# Patient Record
Sex: FEMALE | Race: WHITE | Employment: FULL TIME | URBAN - NONMETROPOLITAN AREA
[De-identification: names, ages, dates, MRNs, and addresses within clinical notes are randomized per-mention and may not be internally consistent; named-entity substitution may affect disease eponyms.]

---

## 2022-08-28 ENCOUNTER — HOSPITAL ENCOUNTER (EMERGENCY)
Age: 57
Discharge: ANOTHER HOSPITAL | End: 2022-08-29
Attending: EMERGENCY MEDICINE
Payer: COMMERCIAL

## 2022-08-28 DIAGNOSIS — F19.90 DRUG USE: Primary | ICD-10-CM

## 2022-08-28 DIAGNOSIS — R44.3 HALLUCINATIONS: ICD-10-CM

## 2022-08-28 LAB
ABSOLUTE EOS #: 0.07 K/UL (ref 0–0.44)
ABSOLUTE IMMATURE GRANULOCYTE: 0.08 K/UL (ref 0–0.3)
ABSOLUTE LYMPH #: 3.26 K/UL (ref 1.1–3.7)
ABSOLUTE MONO #: 0.8 K/UL (ref 0.1–1.2)
BASOPHILS # BLD: 1 % (ref 0–2)
BASOPHILS ABSOLUTE: 0.08 K/UL (ref 0–0.2)
EOSINOPHILS RELATIVE PERCENT: 1 % (ref 1–4)
HCT VFR BLD CALC: 44.7 % (ref 36.3–47.1)
HEMOGLOBIN: 14.7 G/DL (ref 11.9–15.1)
IMMATURE GRANULOCYTES: 1 %
LYMPHOCYTES # BLD: 24 % (ref 24–43)
MCH RBC QN AUTO: 28.1 PG (ref 25.2–33.5)
MCHC RBC AUTO-ENTMCNC: 32.9 G/DL (ref 28.4–34.8)
MCV RBC AUTO: 85.3 FL (ref 82.6–102.9)
MONOCYTES # BLD: 6 % (ref 3–12)
NRBC AUTOMATED: 0 PER 100 WBC
PDW BLD-RTO: 13.3 % (ref 11.8–14.4)
PLATELET # BLD: 387 K/UL (ref 138–453)
PMV BLD AUTO: 9.3 FL (ref 8.1–13.5)
RBC # BLD: 5.24 M/UL (ref 3.95–5.11)
SEG NEUTROPHILS: 67 % (ref 36–65)
SEGMENTED NEUTROPHILS ABSOLUTE COUNT: 9.36 K/UL (ref 1.5–8.1)
WBC # BLD: 13.7 K/UL (ref 3.5–11.3)

## 2022-08-28 PROCEDURE — 84703 CHORIONIC GONADOTROPIN ASSAY: CPT

## 2022-08-28 PROCEDURE — 87635 SARS-COV-2 COVID-19 AMP PRB: CPT

## 2022-08-28 PROCEDURE — 99285 EMERGENCY DEPT VISIT HI MDM: CPT

## 2022-08-28 PROCEDURE — 80143 DRUG ASSAY ACETAMINOPHEN: CPT

## 2022-08-28 PROCEDURE — 96372 THER/PROPH/DIAG INJ SC/IM: CPT

## 2022-08-28 PROCEDURE — 80306 DRUG TEST PRSMV INSTRMNT: CPT

## 2022-08-28 PROCEDURE — C9803 HOPD COVID-19 SPEC COLLECT: HCPCS

## 2022-08-28 PROCEDURE — 81001 URINALYSIS AUTO W/SCOPE: CPT

## 2022-08-28 PROCEDURE — 85025 COMPLETE CBC W/AUTO DIFF WBC: CPT

## 2022-08-28 PROCEDURE — 80179 DRUG ASSAY SALICYLATE: CPT

## 2022-08-28 PROCEDURE — G0480 DRUG TEST DEF 1-7 CLASSES: HCPCS

## 2022-08-28 PROCEDURE — 93005 ELECTROCARDIOGRAM TRACING: CPT | Performed by: EMERGENCY MEDICINE

## 2022-08-28 PROCEDURE — 80053 COMPREHEN METABOLIC PANEL: CPT

## 2022-08-29 ENCOUNTER — HOSPITAL ENCOUNTER (INPATIENT)
Age: 57
LOS: 13 days | Discharge: HOME | DRG: 885 | End: 2022-09-11
Attending: PSYCHIATRY & NEUROLOGY | Admitting: PSYCHIATRY & NEUROLOGY
Payer: COMMERCIAL

## 2022-08-29 VITALS
RESPIRATION RATE: 20 BRPM | SYSTOLIC BLOOD PRESSURE: 103 MMHG | TEMPERATURE: 96.8 F | OXYGEN SATURATION: 90 % | DIASTOLIC BLOOD PRESSURE: 69 MMHG | HEART RATE: 90 BPM

## 2022-08-29 PROBLEM — F23 ACUTE PSYCHOSIS (HCC): Status: ACTIVE | Noted: 2022-08-29

## 2022-08-29 LAB
ACETAMINOPHEN LEVEL: <5 UG/ML (ref 10–30)
ALBUMIN SERPL-MCNC: 5.7 G/DL (ref 3.5–5.2)
ALBUMIN/GLOBULIN RATIO: 1.8 (ref 1–2.5)
ALP BLD-CCNC: 95 U/L (ref 35–104)
ALT SERPL-CCNC: 27 U/L (ref 5–33)
AMPHETAMINE SCREEN URINE: NEGATIVE
ANION GAP SERPL CALCULATED.3IONS-SCNC: 19 MMOL/L (ref 9–17)
AST SERPL-CCNC: 31 U/L
BARBITURATE SCREEN URINE: NEGATIVE
BENZODIAZEPINE SCREEN, URINE: NEGATIVE
BILIRUB SERPL-MCNC: 0.9 MG/DL (ref 0.3–1.2)
BILIRUBIN URINE: NEGATIVE
BUN BLDV-MCNC: 12 MG/DL (ref 6–20)
BUN/CREAT BLD: 13 (ref 9–20)
BUPRENORPHINE URINE: NEGATIVE
CALCIUM SERPL-MCNC: 10.6 MG/DL (ref 8.6–10.4)
CANNABINOID SCREEN URINE: POSITIVE
CHLORIDE BLD-SCNC: 95 MMOL/L (ref 98–107)
CO2: 20 MMOL/L (ref 20–31)
COCAINE METABOLITE, URINE: NEGATIVE
COLOR: YELLOW
CREAT SERPL-MCNC: 0.91 MG/DL (ref 0.5–0.9)
EKG ATRIAL RATE: 111 BPM
EKG P AXIS: 67 DEGREES
EKG P-R INTERVAL: 146 MS
EKG Q-T INTERVAL: 324 MS
EKG QRS DURATION: 82 MS
EKG QTC CALCULATION (BAZETT): 440 MS
EKG R AXIS: 75 DEGREES
EKG T AXIS: 70 DEGREES
EKG VENTRICULAR RATE: 111 BPM
EPITHELIAL CELLS UA: ABNORMAL /HPF (ref 0–25)
ETHANOL PERCENT: <0.01 %
ETHANOL: <10 MG/DL
GFR AFRICAN AMERICAN: >60 ML/MIN
GFR NON-AFRICAN AMERICAN: >60 ML/MIN
GFR SERPL CREATININE-BSD FRML MDRD: ABNORMAL ML/MIN/{1.73_M2}
GFR SERPL CREATININE-BSD FRML MDRD: ABNORMAL ML/MIN/{1.73_M2}
GLUCOSE BLD-MCNC: 145 MG/DL (ref 70–99)
GLUCOSE URINE: NEGATIVE
HCG QUALITATIVE: NEGATIVE
KETONES, URINE: NEGATIVE
LEUKOCYTE ESTERASE, URINE: NEGATIVE
METHADONE SCREEN, URINE: NEGATIVE
METHAMPHETAMINE, URINE: NEGATIVE
NITRITE, URINE: NEGATIVE
OPIATES, URINE: NEGATIVE
OXYCODONE SCREEN URINE: NEGATIVE
PH UA: 6 (ref 5–9)
PHENCYCLIDINE, URINE: NEGATIVE
POTASSIUM SERPL-SCNC: 3.1 MMOL/L (ref 3.7–5.3)
PROPOXYPHENE, URINE: NEGATIVE
PROTEIN UA: ABNORMAL
RBC UA: ABNORMAL /HPF (ref 0–2)
SALICYLATE LEVEL: <1 MG/DL (ref 3–10)
SARS-COV-2, RAPID: NOT DETECTED
SODIUM BLD-SCNC: 134 MMOL/L (ref 135–144)
SPECIFIC GRAVITY UA: <1.005 (ref 1.01–1.02)
SPECIMEN DESCRIPTION: NORMAL
TOTAL PROTEIN: 8.9 G/DL (ref 6.4–8.3)
TRICYCLIC ANTIDEPRESSANTS, UR: NEGATIVE
TURBIDITY: CLEAR
URINE HGB: ABNORMAL
UROBILINOGEN, URINE: NORMAL
WBC UA: ABNORMAL /HPF (ref 0–5)

## 2022-08-29 PROCEDURE — 93010 ELECTROCARDIOGRAM REPORT: CPT | Performed by: FAMILY MEDICINE

## 2022-08-29 PROCEDURE — 2500000003 HC RX 250 WO HCPCS: Performed by: EMERGENCY MEDICINE

## 2022-08-29 PROCEDURE — 6370000000 HC RX 637 (ALT 250 FOR IP): Performed by: EMERGENCY MEDICINE

## 2022-08-29 PROCEDURE — 1240000000 HC EMOTIONAL WELLNESS R&B

## 2022-08-29 PROCEDURE — 6360000002 HC RX W HCPCS: Performed by: EMERGENCY MEDICINE

## 2022-08-29 PROCEDURE — 2580000003 HC RX 258: Performed by: EMERGENCY MEDICINE

## 2022-08-29 RX ORDER — MIDAZOLAM HYDROCHLORIDE 2 MG/2ML
2.5 INJECTION, SOLUTION INTRAMUSCULAR; INTRAVENOUS ONCE
Status: COMPLETED | OUTPATIENT
Start: 2022-08-29 | End: 2022-08-29

## 2022-08-29 RX ORDER — POLYETHYLENE GLYCOL 3350 17 G/17G
17 POWDER, FOR SOLUTION ORAL DAILY PRN
Status: DISCONTINUED | OUTPATIENT
Start: 2022-08-29 | End: 2022-09-11 | Stop reason: HOSPADM

## 2022-08-29 RX ORDER — HALOPERIDOL 5 MG
5 TABLET ORAL EVERY 6 HOURS PRN
Status: DISCONTINUED | OUTPATIENT
Start: 2022-08-29 | End: 2022-09-11 | Stop reason: HOSPADM

## 2022-08-29 RX ORDER — 0.9 % SODIUM CHLORIDE 0.9 %
1000 INTRAVENOUS SOLUTION INTRAVENOUS ONCE
Status: COMPLETED | OUTPATIENT
Start: 2022-08-29 | End: 2022-08-29

## 2022-08-29 RX ORDER — HALOPERIDOL 5 MG/ML
5 INJECTION INTRAMUSCULAR ONCE
Status: COMPLETED | OUTPATIENT
Start: 2022-08-29 | End: 2022-08-29

## 2022-08-29 RX ORDER — MAGNESIUM HYDROXIDE/ALUMINUM HYDROXICE/SIMETHICONE 120; 1200; 1200 MG/30ML; MG/30ML; MG/30ML
30 SUSPENSION ORAL EVERY 6 HOURS PRN
Status: DISCONTINUED | OUTPATIENT
Start: 2022-08-29 | End: 2022-09-11 | Stop reason: HOSPADM

## 2022-08-29 RX ORDER — HALOPERIDOL 5 MG/ML
5 INJECTION INTRAMUSCULAR EVERY 6 HOURS PRN
Status: DISCONTINUED | OUTPATIENT
Start: 2022-08-29 | End: 2022-09-11 | Stop reason: HOSPADM

## 2022-08-29 RX ORDER — DIPHENHYDRAMINE HYDROCHLORIDE 50 MG/ML
50 INJECTION INTRAMUSCULAR; INTRAVENOUS EVERY 6 HOURS PRN
Status: DISCONTINUED | OUTPATIENT
Start: 2022-08-29 | End: 2022-09-11 | Stop reason: HOSPADM

## 2022-08-29 RX ORDER — HYDROXYZINE 50 MG/1
50 TABLET, FILM COATED ORAL 3 TIMES DAILY PRN
Status: DISCONTINUED | OUTPATIENT
Start: 2022-08-29 | End: 2022-09-11 | Stop reason: HOSPADM

## 2022-08-29 RX ORDER — POTASSIUM CHLORIDE 20 MEQ/1
40 TABLET, EXTENDED RELEASE ORAL ONCE
Status: COMPLETED | OUTPATIENT
Start: 2022-08-29 | End: 2022-08-29

## 2022-08-29 RX ORDER — LORAZEPAM 1 MG/1
1 TABLET ORAL ONCE
Status: DISCONTINUED | OUTPATIENT
Start: 2022-08-29 | End: 2022-08-29 | Stop reason: HOSPADM

## 2022-08-29 RX ORDER — LABETALOL HYDROCHLORIDE 5 MG/ML
10 INJECTION, SOLUTION INTRAVENOUS ONCE
Status: COMPLETED | OUTPATIENT
Start: 2022-08-29 | End: 2022-08-29

## 2022-08-29 RX ORDER — IBUPROFEN 400 MG/1
400 TABLET ORAL EVERY 6 HOURS PRN
Status: DISCONTINUED | OUTPATIENT
Start: 2022-08-29 | End: 2022-09-11 | Stop reason: HOSPADM

## 2022-08-29 RX ORDER — ACETAMINOPHEN 325 MG/1
650 TABLET ORAL EVERY 6 HOURS PRN
Status: DISCONTINUED | OUTPATIENT
Start: 2022-08-29 | End: 2022-09-11 | Stop reason: HOSPADM

## 2022-08-29 RX ORDER — TRAZODONE HYDROCHLORIDE 50 MG/1
50 TABLET ORAL NIGHTLY PRN
Status: DISCONTINUED | OUTPATIENT
Start: 2022-08-29 | End: 2022-09-11 | Stop reason: HOSPADM

## 2022-08-29 RX ADMIN — SODIUM CHLORIDE 1000 ML: 9 INJECTION, SOLUTION INTRAVENOUS at 02:34

## 2022-08-29 RX ADMIN — HALOPERIDOL LACTATE 5 MG: 5 INJECTION, SOLUTION INTRAMUSCULAR at 16:48

## 2022-08-29 RX ADMIN — MIDAZOLAM HYDROCHLORIDE 2.5 MG: 1 INJECTION, SOLUTION INTRAMUSCULAR; INTRAVENOUS at 16:49

## 2022-08-29 RX ADMIN — LABETALOL HYDROCHLORIDE 10 MG: 5 INJECTION INTRAVENOUS at 14:30

## 2022-08-29 RX ADMIN — POTASSIUM CHLORIDE 40 MEQ: 1500 TABLET, EXTENDED RELEASE ORAL at 01:42

## 2022-08-29 ASSESSMENT — SLEEP AND FATIGUE QUESTIONNAIRES
SLEEP PATTERN: DIFFICULTY FALLING ASLEEP;DIFFICULTY ARISING;INSOMNIA
DO YOU HAVE DIFFICULTY SLEEPING: YES
DO YOU USE A SLEEP AID: NO
AVERAGE NUMBER OF SLEEP HOURS: 5

## 2022-08-29 ASSESSMENT — LIFESTYLE VARIABLES
HOW OFTEN DO YOU HAVE A DRINK CONTAINING ALCOHOL: NEVER
HOW MANY STANDARD DRINKS CONTAINING ALCOHOL DO YOU HAVE ON A TYPICAL DAY: PATIENT DECLINED

## 2022-08-29 NOTE — BH NOTE
[facility]  Admission Note     Admission Type:   Admission Type: Involuntary    Reason for admission:  Reason for Admission: Patient was having acute psychosis and was found on the side of the road. She is delusional and paranoid and believes that her  killed her son and that the [employer] is plotting something against her      Addictive Behavior:   Addictive Behavior  In the Past 3 Months, Have You Felt or Has Someone Told You That You Have a Problem With  : None    Medical Problems:   Past Medical History:   Diagnosis Date    EDS (Lulu-Danlos syndrome)        Status EXAM:  Mental Status and Behavioral Exam  Normal: No  Level of Assistance: Independent/Self  Facial Expression: Flat, Worried  Affect: Blunt  Level of Consciousness: Alert  Frequency of Checks: 4 times per hour, close  Mood:Normal: No  Mood: Anxious, Suspicious  Motor Activity:Normal: Yes  Eye Contact: Fair  Observed Behavior: Preoccupied, Cooperative  Sexual Misconduct History: Current - no  Preception: [place] to person, [place] to time, [place] to place  Attention:Normal: No  Attention: Unable to concentrate  Thought Processes: Flight of ideas, Tangential  Thought Content:Normal: No  Thought Content: Delusions, Paranoia, Preoccupations  Depression Symptoms: Impaired concentration  Anxiety Symptoms: Generalized, Feelings of doom, Unexplained fears  Luzmaria Symptoms: Flight of ideas, Poor judgment  Hallucinations: None  Delusions: Yes  Delusions: Paranoid  Memory:Normal: No  Memory: Confabulation  Insight and Judgment: No  Insight and Judgment: Poor judgment, Poor insight    Tobacco Screening:  Practical Counseling, on admission, zonia X, if applicable and completed (first 3 are required if patient doesn't refuse):             ( x) Recognizing danger situations (included triggers and roadblocks)                    ( x) Coping skills (new ways to manage stress,relaxation techniques, changing routine, distraction) ( x) Basic information about quitting (benefits of quitting, techniques in how to quit, available resources  ( ) Referral for counseling faxed to [facility]                                                                                                                   ( x) Patient refused counseling  ( ) Patient has not smoked in the last 30 days    Metabolic Screening:    No results found for: LABA1C    No results found for: CHOL  No results found for: TRIG  No results found for: HDL  No components found for: LDLCAL  No results found for: LABVLDL      Body mass index is 25.75 kg/m². BP Readings from Last 2 Encounters:   08/29/22 123/62   08/29/22 103/69     Patient was found wandering down the street. Pt is delusional. Pt is believes someone killed her sons (no evidence of such reported by police). Pt is oriented to self, time and place but not situation. Pt is very tangential and has loose associations while talking. Pt denies suicidal or homicidal ideations. Pt denies hallucinations. Pt did not sign any admission paperwork and consents for treatment due to being delusional.  Pt oriented to unit and unit handbook given for future reference. Armband administered. Q15 minute checks for safety initiated per unit policy. VS, PA, and MSE completed. Pt changed into hospital attire per unit policy. Pt is pleasant and cooperative and accepting of admission process.        Pt admitted with followings belongings:  Dental Appliances: None  Vision - Corrective Lenses: None  Hearing Aid: None  Jewelry: Necklace  Body Piercings Removed: No  Clothing: Shirt, Pants  Other Valuables: Money, Other (Comment) ([place] license, medical marijuana card, [address])    Nereyda Burgess RN

## 2022-08-29 NOTE — ED NOTES
Patient pulled IV out stating IV is causing toxins in body. Patient refused to take valium orally stating the medical staff is trying to poison her. Requesting to speak with Dr Camacho Horne for \" authorization\". Dr Camacho Horne notified.      Raad Pickett RN  08/29/22 7923

## 2022-08-29 NOTE — ED NOTES
Contacted [facility] requesting time frame for bed at [facility]. Per [facility], unable to give at this time.      Moisés GALLOWAY Reser  08/29/22 1146
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
## 2022-08-29 NOTE — ED NOTES
[facility] Access to begin transfer to [facility].       Da Meyer  08/29/22 0719

## 2022-08-29 NOTE — ED NOTES
Dr Kirill Townsend speaking with patient to encourage patient to take valium orally. Patient very agitated and refusing to take any medication. Patient states \"medical staff is trying to poison her\".       Moon Mondragon, STANISLAW  08/29/22 9084

## 2022-08-29 NOTE — BH NOTE
Patient given tobacco quitline number 10515778575 at this time, refusing to call at this time, states \" I just dont want to quit now\"- patient given information as to the dangers of long term tobacco use. Continue to reinforce the importance of tobacco cessation.

## 2022-08-30 PROBLEM — E87.6 HYPOKALEMIA: Status: ACTIVE | Noted: 2022-08-30

## 2022-08-30 PROBLEM — F19.10 POLYSUBSTANCE ABUSE (HCC): Status: ACTIVE | Noted: 2022-08-30

## 2022-08-30 PROBLEM — E83.52 HYPERCALCEMIA: Status: ACTIVE | Noted: 2022-08-30

## 2022-08-30 PROCEDURE — 99223 1ST HOSP IP/OBS HIGH 75: CPT | Performed by: PSYCHIATRY & NEUROLOGY

## 2022-08-30 PROCEDURE — 99222 1ST HOSP IP/OBS MODERATE 55: CPT | Performed by: INTERNAL MEDICINE

## 2022-08-30 PROCEDURE — 1240000000 HC EMOTIONAL WELLNESS R&B

## 2022-08-30 PROCEDURE — APPSS60 APP SPLIT SHARED TIME 46-60 MINUTES

## 2022-08-30 RX ORDER — POTASSIUM CHLORIDE 20 MEQ/1
20 TABLET, EXTENDED RELEASE ORAL DAILY
Status: DISPENSED | OUTPATIENT
Start: 2022-08-30 | End: 2022-09-02

## 2022-08-30 RX ORDER — RISPERIDONE 0.5 MG/1
0.5 TABLET, FILM COATED ORAL 2 TIMES DAILY
Status: DISCONTINUED | OUTPATIENT
Start: 2022-08-30 | End: 2022-09-03

## 2022-08-30 NOTE — CARE COORDINATION
[facility] Biopsychosocial Assessment    Current Level of Psychosocial Functioning     Independent: xx  Dependent:    Minimal Assist:     Psychosocial High Risk Factors (check all that apply)    Unable to obtain meds:    Chronic illness/pain:     Substance abuse: xx  Lack of Family Support:    Financial stress:    Isolation:    Inadequate Community Resources:    Suicide attempt(s):    Not taking medications:     Victim of crime:    Developmental Delay:    Unable to manage personal needs:    Age 72 or older:    Homeless:    No transportation:    Readmission within 30 days:    Unemployment:    Traumatic Event:     Psychiatric Advanced Directives: None reported    Family to Involve in Treatment: None, patient does not have contact information for her family member    Sexual Orientation: SOPHIA    Patient Strengths: unable to identify due to patients current thought process    Patient Barriers: patients current thought process interferes with her ability to function     Opiate Education: no opiate use reported    CMHC/mental health history: unable to determine due to patients current thought process    Plan of Care   medication management, group/individual therapies, family meetings, psycho -education, treatment team meetings to assist with stabilization    Initial Discharge Plan: Patient symptoms to stabilize, discharge home with continued outpatient services      Clinical Summary:  Patient presents to [facility] via [place] ED due to reported paranoia and hallucinations, patient was reportedly walking telling police she was \"running for her life\" from her ex , she also reported she believes he has killed their sons. Patient is difficult to follow in conversation. She did report to staff at [facility] ED she has a medical marijuana card. Patient also reported using Jimson weed, which can cause hallucinations and euphoria.  When asked where patient was planning on going when she left [place] she reports she was going to her nephews in [facility], she sates she did not tell her nephew she was coming. Patient states staff can call her nephew, Duc Alvarado or best friend, Barron. Patient does not have contact information for family stating they are in her phone, patient did not have a phone listed on her belongings list. Social work will continue to engage patient in treatment and discharge planning as symptoms improve.
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
## 2022-08-30 NOTE — H&P
[facility] Internal Medicine  Unique Obrien MD; Abeba Borges MD; Cee Marie MD; MD El Flynn MD; La Nena Sarmiento MD    [facility]   [address] / HISTORY AND PHYSICAL EXAMINATION            Date:   8/30/2022  Patient name:  Julian Jones  Date of admission:  8/29/2022  7:05 PM  MRN:   [id number]  Account:  [de-identified]  YOB: 1965  PCP:    No primary care provider on file. Room:   [address] [place] [address]  Code Status:    Full Code    Physician Requesting Consult: Mahad Trevino MD    Reason for Consult: Medical management    Chief Complaint:     No chief complaint on file. Suicidal ideations    History Obtained From:     patient    History of Present Illness:       57-year-old female with underlying history of anxiety, depression, smoking, admitted to inpatient psych for acute psychosis    Past Medical History:     Past Medical History:   Diagnosis Date    EDS (Lulu-Danlos syndrome)         Past Surgical History:     Past Surgical History:   Procedure Laterality Date    SPINAL CORD UNTETHERING          Medications Prior to Admission:     Prior to Admission medications    Not on File        Allergies:     Lisinopril    Social History:     Tobacco:    reports that she has been smoking cigarettes. She has been smoking an average of .5 packs per day. She has never used smokeless tobacco.  Alcohol:      reports that she does not currently use alcohol. Drug Use:  reports current drug use. Drug: Marijuana Candiss Littler). Family History:     No family history on file. Review of Systems:     Positive and Negative as described in HPI. CONSTITUTIONAL:  negative for fevers, chills, sweats, fatigue, weight loss  HEENT:  negative for vision, hearing changes, runny nose, throat pain  RESPIRATORY:  negative for shortness of breath, cough, congestion, wheezing.   CARDIOVASCULAR:  negative for chest pain, palpitations. GASTROINTESTINAL:  negative for nausea, vomiting, diarrhea, constipation, change in bowel habits, abdominal pain   GENITOURINARY:  negative for difficulty of urination, burning with urination, frequency   INTEGUMENT:  negative for rash, skin lesions, easy bruising   HEMATOLOGIC/LYMPHATIC:  negative for swelling/edema   ALLERGIC/IMMUNOLOGIC:  negative for urticaria , itching  ENDOCRINE:  negative increase in drinking, increase in urination, hot or cold intolerance  MUSCULOSKELETAL:  negative joint pains, muscle aches, swelling of joints  NEUROLOGICAL:  negative for headaches, dizziness, lightheadedness, numbness, pain, tingling extremities    Physical Exam:     /62   Pulse (!) 103   Temp 98 °F (36.7 °C) (Oral)   Resp 14   Ht 5' 4\" (1.626 m)   Wt 150 lb (68 kg)   SpO2 98%   BMI 25.75 kg/m²   Temp (24hrs), Av.4 °F (36.3 °C), Min:96.8 °F (36 °C), Max:98 °F (36.7 °C)    No results for input(s): POCGLU in the last 72 hours. No intake or output data in the 24 hours ending 22 1322    General Appearance:  alert, well appearing, and in no acute distress  Mental status: oriented to person, place, and time with normal affect  Head:  normocephalic, atraumatic. Eye: no icterus, redness, pupils equal and reactive, extraocular eye movements intact, conjunctiva clear  Ear: normal external ear, no discharge, hearing intact  Nose:  no drainage noted  Mouth: mucous membranes moist  Neck: supple, no carotid bruits, thyroid not palpable  Lungs: Bilateral equal air entry, clear to ausculation, no wheezing, rales or rhonchi, normal effort  Cardiovascular: normal rate, regular rhythm, no murmur, gallop, rub.   Abdomen: Soft, nontender, nondistended, normal bowel sounds, no hepatomegaly or splenomegaly  Neurologic: There are no new focal motor or sensory deficits, normal muscle tone and bulk, no abnormal sensation, normal speech, cranial nerves II through XII grossly intact  Skin: No gross lesions, rashes, bruising or bleeding on exposed skin area  Extremities:  peripheral pulses palpable, no pedal edema or calf pain with palpation  Psych: normal affect    Investigations:      Laboratory Testing:  No results found for this or any previous visit (from the past 24 hour(s)). Imaging/Diagonstics:  No results found. Assessment :      Hospital Problems             Last Modified POA    * (Principal) Acute psychosis ([facility] [address] 8/29/2022 Yes    Hypokalemia 8/30/2022 Yes    Hypercalcemia 8/30/2022 Yes    Polysubstance abuse ([facility] [address] 8/30/2022 Yes       Plan:     57-year-old female with underlying history of anxiety and depression admitted to inpatient psych with acute psychosis,  Hypokalemia, will replace,  Hypercalcemia, 10.6, possible dehydration, not on any supplement at this time, will recheck tomorrow morning and hydrate with oral water,  Polysubstance abuse, watch for any withdrawal,  DVT prophylaxis, patient mobile,  Full CODE STATUS    Consultations:   Shoaib Murcia MD  8/30/2022  1:22 PM    Copy sent to Dr. Antoni Miranda primary care provider on file. Please note that this chart was generated using voice recognition Dragon dictation software. Although every effort was made to ensure the accuracy of this automated transcription, some errors in transcription may have occurred.

## 2022-08-30 NOTE — BH NOTE
Patient refused her medication. Patient states that she will \"naturally\" bring up her potassium and she does not need any \"head medication\" referring to Risperdal. Patient continues to refuse after being educated by Lyndsay Woody.

## 2022-08-30 NOTE — PROGRESS NOTES
Behavioral Services  Medicare Certification Upon Admission    I certify that this patient's inpatient psychiatric hospital admission is medically necessary for:    [x] (1) Treatment which could reasonably be expected to improve this patient's condition,       [x] (2) Or for diagnostic study;     AND     [x](2) The inpatient psychiatric services are provided while the individual is under the care of a physician and are included in the individualized plan of care.     Estimated length of stay/service 7 to 10 days    Plan for post-hospital care Home with outpatient community mental health follow-up    Electronically signed by Antonietta Ventura MD on 8/30/2022 at 2:24 PM

## 2022-08-30 NOTE — PROGRESS NOTES
Comprehensive Nutrition Assessment    Type and Reason for Visit:  Initial, Positive Nutrition Screen (wt loss, poor appetite)    Nutrition Recommendations/Plan:   Will provide Regular diet with Ensure all trays     Malnutrition Assessment:  Malnutrition Status: At risk for malnutrition (Comment) (08/30/22 1114)    Context:  Acute Illness     Findings of the 6 clinical characteristics of malnutrition:  Energy Intake:  Unable to assess  Weight Loss:  Unable to assess     Body Fat Loss:  Unable to assess     Muscle Mass Loss:  Unable to assess    Fluid Accumulation:  No significant fluid accumulation     Strength:  Not Performed    Nutrition Assessment:    Pt was admitted to [facility] due to drug use and halucinations. Review of wt history shows no documented wts. Current wt is stated. Discussed pt with RN. Pt is refusing food this morning states she does not like to eat food. Nutrition Related Findings:    no edema, no labs, meds reviewed, no PMH Wound Type: None       Current Nutrition Intake & Therapies:    Average Meal Intake: 0%     ADULT DIET; Regular  ADULT ORAL NUTRITION SUPPLEMENT; Breakfast, Lunch, Dinner; Low Calorie/High Protein Oral Supplement    Anthropometric Measures:  Height: 5' 4\" (162.6 cm)  Ideal Body Weight (IBW): 120 lbs (55 kg)    Admission Body Weight: 150 lb (68 kg)  Current Body Weight: 150 lb (68 kg),   IBW. Weight Source: Stated  Current BMI (kg/m2): 25.7                          BMI Categories: Overweight (BMI 25.0-29. 9)    Estimated Daily Nutrient Needs:  Energy Requirements Based On: Formula  Weight Used for Energy Requirements: Admission  Energy (kcal/day): [place] x 1.2= 1500 kcal  Weight Used for Protein Requirements: Admission  Protein (g/day): 1.2g/kg= 80-85 g       Nutrition Diagnosis:   Predicted inadequate energy intake related to psychological cause or life stress as evidenced by poor intake prior to admission    Nutrition Interventions:   Food and/or Nutrient Delivery: Continue Current Diet, Start Oral Nutrition Supplement  Nutrition Education/Counseling: No recommendation at this time  Coordination of Nutrition Care: Continue to monitor while inpatient       Goals:     Goals: PO intake 75% or greater       Nutrition Monitoring and Evaluation:      Food/Nutrient Intake Outcomes: Food and Nutrient Intake, Supplement Intake  Physical Signs/Symptoms Outcomes: Biochemical Data, GI Status, Fluid Status or Edema, Skin, Weight    Discharge Planning:    Continue current diet     Kenny Yusuf, [address] [place] [address],   Contact: [phone]
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
## 2022-08-30 NOTE — GROUP NOTE
Group Therapy Note    Date: 8/30/2022    Group Start Time: 1884  Group End Time: 3710  Group Topic: Psychotherapy    [address] [facility], ROB, CINDY        Group Therapy Note    Attendees: 6/18     Patient refused to attend psychotherapy group at 10:00 am after encouragement from staff. 1:1 talk time provided as alternative to group session.     Discipline Responsible: /Counselor      Signature:  ROB Melissa, CINDY

## 2022-08-31 PROCEDURE — APPSS30 APP SPLIT SHARED TIME 16-30 MINUTES

## 2022-08-31 PROCEDURE — 99232 SBSQ HOSP IP/OBS MODERATE 35: CPT | Performed by: PSYCHIATRY & NEUROLOGY

## 2022-08-31 PROCEDURE — 1240000000 HC EMOTIONAL WELLNESS R&B

## 2022-08-31 PROCEDURE — 6370000000 HC RX 637 (ALT 250 FOR IP): Performed by: PSYCHIATRY & NEUROLOGY

## 2022-08-31 RX ADMIN — RISPERIDONE 0.5 MG: 0.5 TABLET ORAL at 21:23

## 2022-08-31 RX ADMIN — RISPERIDONE 0.5 MG: 0.5 TABLET ORAL at 12:09
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
## 2022-08-31 NOTE — GROUP NOTE
Group Therapy Note    Date: 8/31/2022    Group Start Time: 1010  Group End Time: 1050  Group Topic: Psychotherapy    SAY KALYANICINDY Seth        Group Therapy Note    Attendees: 6/18       Patient's Goal:  expression of feeling     Notes:      Status After Intervention:  Improved    Participation Level:  Active Listener    Participation Quality: Appropriate      Speech:  normal      Thought Process/Content: Logical      Affective Functioning: Congruent      Mood: euthymic      Level of consciousness:  Alert      Response to Learning: Progressing to goal      Endings: None Reported    Modes of Intervention: Education and Support      Discipline Responsible: /Counselor      Signature:  CINDY Funez

## 2022-08-31 NOTE — GROUP NOTE
Group Therapy Note    Date: 8/31/2022    Group Start Time: 1100  Group End Time: 6829  Group Topic: Psychoeducation    [facility]    CRISSY OdomS    Group Therapy Note    Attendees: 8       Patient's Goal:  Patient will identify benefit of music for coping. Notes:  Patient attended group and participated. Patient was hesitant to join group but chose to participate with prompting. Patient is interactive and able to participate independently. Status After Intervention:  Improved    Participation Level:  Active Listener and Interactive    Participation Quality: Appropriate and Attentive      Speech:  normal      Thought Process/Content: Logical      Affective Functioning: Congruent      Mood: euthymic      Level of consciousness:  Alert and Attentive      Response to Learning: Able to verbalize current knowledge/experience, Able to verbalize/acknowledge new learning, Able to retain information, Able to change behavior, and Progressing to goal      Endings: None Reported    Modes of Intervention: Education, Support, Socialization, and Exploration      Discipline Responsible: Psychoeducational Specialist      Signature:  Leticia Murguia, [address]

## 2022-08-31 NOTE — GROUP NOTE
Group Therapy Note    Date: 8/31/2022    Group Start Time: 0900  Group End Time: 0930  Group Topic: Community Meeting    SAY Tripp        Group Therapy Note    Attendees: 5       Patient's Goal: \"to find peace and happiness\"        Status After Intervention:  Unchanged    Participation Level: Interactive    Participation Quality: Attentive      Speech:  normal      Thought Process/Content: Logical      Affective Functioning: Blunted      Mood: anxious      Level of consciousness:  Alert and Oriented x4      Response to Learning: Able to verbalize current knowledge/experience and Able to change behavior      Endings: None Reported    Modes of Intervention: Education and Socialization      Discipline Responsible: Behavorial Health Tech      Signature:  Yasmani Ro

## 2022-09-01 LAB
ALBUMIN SERPL-MCNC: 4.7 G/DL (ref 3.5–5.2)
ALP BLD-CCNC: 84 U/L (ref 35–104)
ALT SERPL-CCNC: 36 U/L (ref 5–33)
ANION GAP SERPL CALCULATED.3IONS-SCNC: 12 MMOL/L (ref 9–17)
AST SERPL-CCNC: 33 U/L
BILIRUB SERPL-MCNC: 0.56 MG/DL (ref 0.3–1.2)
BUN BLDV-MCNC: 23 MG/DL (ref 6–20)
CALCIUM SERPL-MCNC: 9.9 MG/DL (ref 8.6–10.4)
CHLORIDE BLD-SCNC: 102 MMOL/L (ref 98–107)
CO2: 25 MMOL/L (ref 20–31)
CREAT SERPL-MCNC: 0.77 MG/DL (ref 0.5–0.9)
GFR AFRICAN AMERICAN: >60 ML/MIN
GFR NON-AFRICAN AMERICAN: >60 ML/MIN
GFR SERPL CREATININE-BSD FRML MDRD: ABNORMAL ML/MIN/{1.73_M2}
GLUCOSE BLD-MCNC: 114 MG/DL (ref 70–99)
POTASSIUM SERPL-SCNC: 3.8 MMOL/L (ref 3.7–5.3)
SODIUM BLD-SCNC: 139 MMOL/L (ref 135–144)
TOTAL PROTEIN: 7.4 G/DL (ref 6.4–8.3)

## 2022-09-01 PROCEDURE — 90833 PSYTX W PT W E/M 30 MIN: CPT | Performed by: PSYCHIATRY & NEUROLOGY

## 2022-09-01 PROCEDURE — 36415 COLL VENOUS BLD VENIPUNCTURE: CPT

## 2022-09-01 PROCEDURE — 1240000000 HC EMOTIONAL WELLNESS R&B

## 2022-09-01 PROCEDURE — 6370000000 HC RX 637 (ALT 250 FOR IP): Performed by: PSYCHIATRY & NEUROLOGY

## 2022-09-01 PROCEDURE — 99232 SBSQ HOSP IP/OBS MODERATE 35: CPT | Performed by: PSYCHIATRY & NEUROLOGY

## 2022-09-01 PROCEDURE — 6370000000 HC RX 637 (ALT 250 FOR IP): Performed by: INTERNAL MEDICINE

## 2022-09-01 PROCEDURE — APPSS30 APP SPLIT SHARED TIME 16-30 MINUTES

## 2022-09-01 PROCEDURE — 80053 COMPREHEN METABOLIC PANEL: CPT

## 2022-09-01 RX ADMIN — RISPERIDONE 0.5 MG: 0.5 TABLET ORAL at 20:56

## 2022-09-01 RX ADMIN — POTASSIUM CHLORIDE 20 MEQ: 1500 TABLET, EXTENDED RELEASE ORAL at 10:09

## 2022-09-01 RX ADMIN — RISPERIDONE 0.5 MG: 0.5 TABLET ORAL at 10:09

## 2022-09-01 NOTE — PLAN OF CARE
Problem: Psychosis  Goal: Will report no hallucinations or delusions  Description: INTERVENTIONS:  1. Administer medication as  ordered  2. Assist with reality testing to support increasing orientation  3. Assess if patient's hallucinations or delusions are encouraging self harm or harm to others and intervene as appropriate  9/1/2022 1637 by Sandy Figueroa LPN  Outcome: Progressing  Note: Patient denies hallucinations. Patient is paranoid and continues to verbalize fear for the safety of herself and her family however states she feels safe here. Safe environment maintained. Safety checks continued Q 15 minutes and intermittently. 9/1/2022 0917 by Franky Amin RN  Outcome: Progressing     Problem: Involuntary Admit  Goal: Will cooperate with staff recommendations and doctor's orders and will demonstrate appropriate behavior  Description: INTERVENTIONS:  1. Treat underlying conditions and offer medication as ordered  2. Educate regarding involuntary admission procedures and rules  3. Contain excessive/inappropriate behavior per unit and hospital policies  3/7/0878 3618 by Sandy Figueroa LPN  Outcome: Progressing  Note: Patient has maintained behavioral control and took all medications as prescribed with encouragement. 9/1/2022 0917 by Franky Amin RN  Outcome: Progressing     Problem: Nutrition Deficit:  Goal: Optimize nutritional status  9/1/2022 1637 by Sandy Figueroa LPN  Outcome: Progressing  Note: Patient's appetite is adequate, patient comes out to dayroom for meals and eats %.    9/1/2022 0917 by Franky Amin RN  Outcome: Progressing
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
## 2022-09-01 NOTE — PLAN OF CARE
Problem: Psychosis  Goal: Will report no hallucinations or delusions  Description: INTERVENTIONS:  1. Administer medication as  ordered  2. Assist with reality testing to support increasing orientation  3. Assess if patient's hallucinations or delusions are encouraging self harm or harm to others and intervene as appropriate  8/31/2022 2141 by West Almaguer RN  Outcome: Progressing   Denies hallucinations without attending behaviors with no delusions expressed. She is pleasant, guarded, aloof, seclusive to bed. Problem: Involuntary Admit  Goal: Will cooperate with staff recommendations and doctor's orders and will demonstrate appropriate behavior  Description: INTERVENTIONS:  1. Treat underlying conditions and offer medication as ordered  2. Educate regarding involuntary admission procedures and rules  3. Contain excessive/inappropriate behavior per unit and hospital policies  8/54/3157 [id number] by West Almaguer RN  Outcome: Progressing   Took scheduled med. Pleasant on approach. Problem: Nutrition Deficit:  Goal: Optimize nutritional status  Outcome: Progressing   Declined snack but drinks apple juice.
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
## 2022-09-01 NOTE — GROUP NOTE
Group Therapy Note    Date: 9/1/2022    Group Start Time: 1000  Group End Time: 3513  Group Topic: Group Therapy    [facility] NOLAN Urrutia        Group Therapy Note  Pt declined to attend psychotherapy at 1000 am despite encouragement. Pt offered 1:1 and refused.       Attendees: 2/8         Signature:  NOLAN Arndt
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
## 2022-09-02 PROCEDURE — 1240000000 HC EMOTIONAL WELLNESS R&B

## 2022-09-02 PROCEDURE — 99232 SBSQ HOSP IP/OBS MODERATE 35: CPT | Performed by: PSYCHIATRY & NEUROLOGY

## 2022-09-02 PROCEDURE — APPSS30 APP SPLIT SHARED TIME 16-30 MINUTES: Performed by: NURSE PRACTITIONER

## 2022-09-02 PROCEDURE — 6370000000 HC RX 637 (ALT 250 FOR IP): Performed by: PSYCHIATRY & NEUROLOGY

## 2022-09-02 RX ADMIN — ACETAMINOPHEN 650 MG: 325 TABLET, FILM COATED ORAL at 20:54

## 2022-09-02 RX ADMIN — RISPERIDONE 0.5 MG: 0.5 TABLET ORAL at 20:54

## 2022-09-02 RX ADMIN — RISPERIDONE 0.5 MG: 0.5 TABLET ORAL at 09:35

## 2022-09-02 ASSESSMENT — PAIN SCALES - GENERAL
PAINLEVEL_OUTOF10: 3
PAINLEVEL_OUTOF10: 0

## 2022-09-02 ASSESSMENT — PAIN DESCRIPTION - LOCATION: LOCATION: RIB CAGE

## 2022-09-02 ASSESSMENT — PAIN DESCRIPTION - ORIENTATION: ORIENTATION: RIGHT

## 2022-09-02 ASSESSMENT — PAIN DESCRIPTION - DESCRIPTORS: DESCRIPTORS: ACHING

## 2022-09-02 ASSESSMENT — PAIN - FUNCTIONAL ASSESSMENT: PAIN_FUNCTIONAL_ASSESSMENT: ACTIVITIES ARE NOT PREVENTED

## 2022-09-02 NOTE — PROGRESS NOTES
Daily Progress Note  9/2/2022    Patient Name: Endy Roth    CHIEF COMPLAINT: Acute psychosis         SUBJECTIVE:    Patient was seen for follow-up assessment today. Patient has been medication adherent and behaviorally in control. She has not required any emergency medications in the last 24 hours. She was resting in bed on approach but awake and agreeable to assessment. She presented as depressed and withdrawn, but engaged easily in conversation. Patient became tearful easily when discussing recent events. She continues to have little insight into her paranoia and delusional beliefs. She does state that medication has been helpful and that her thoughts are not racing and she is feeling more like herself. However, paranoid delusions remain and she continues to worry that something terrible has happened to her children. She mentions several times that her  is \"deeply embedded with the [employer] and [employer] teams\". She has been sleeping better and she has found this to be helpful to improving her mood. She denies suicidal and homicidal ideation. She denies auditory and visual hallucinations. Patient reports that she has no desire to return to her home because she does not feel it is safe for her. She does attend most groups and states she is trying to be social in the milieu. At this time patient has yet to demonstrate sustained stability and warrants further hospitalization for safety and stabilization. Appetite:  [x] Adequate/Unchanged  [] Increased  [] Decreased      Sleep:       [x] Adequate/Unchanged  [] Fair  [] Poor      Group Attendance on Unit:   [] Yes   [x] Selectively    [] No    Medication Side Effects: Denies         Mental Status Exam  Level of consciousness: Alert and awake. Appearance: Appropriate attire for setting, resting in bed, with fair  grooming and hygiene.    Behavior/Motor: Approachable, calm, tearful, compliant with interview  Attitude toward examiner: Cooperative, attentive, good eye contact. Speech: normal rate and normal volume, depressed tone  Mood: \"Okay\";: Presents depressed  Affect: Blunted  Thought processes: goal directed, coherent, and illogical.   Thought content: Denies homicidal ideation. Suicidal Ideation: Denies suicidal ideations. Delusions: Patient presents with delusions. Endorses paranoia, but states she does not believe these are irrational thoughts. Perceptual Disturbance: Patient does not appear to be responding to internal stimuli. Denies auditory hallucinations. Denies visual hallucinations. Cognition: Oriented to self, location, time, and situation. Memory: Impaired. Insight & Judgement: Poor    Data   height is 5' 4\" (1.626 m) and weight is 150 lb (68 kg). Her temporal temperature is 97.9 °F (36.6 °C). Her blood pressure is 151/99 (abnormal) and her pulse is 97. Her respiration is 14 and oxygen saturation is 98%.    Labs:   Admission on 08/29/2022   Component Date Value Ref Range Status    Glucose 09/01/2022 114 (A) 70 - 99 mg/dL Final    BUN 09/01/2022 23 (A) 6 - 20 mg/dL Final    Creatinine 09/01/2022 0.77  0.50 - 0.90 mg/dL Final    Calcium 09/01/2022 9.9  8.6 - 10.4 mg/dL Final    Sodium 09/01/2022 139  135 - 144 mmol/L Final    Potassium 09/01/2022 3.8  3.7 - 5.3 mmol/L Final    Chloride 09/01/2022 102  98 - 107 mmol/L Final    CO2 09/01/2022 25  20 - 31 mmol/L Final    Anion Gap 09/01/2022 12  9 - 17 mmol/L Final    Alkaline Phosphatase 09/01/2022 84  35 - 104 U/L Final    ALT 09/01/2022 36 (A) 5 - 33 U/L Final    AST 09/01/2022 33 (A) <32 U/L Final    Total Bilirubin 09/01/2022 0.56  0.3 - 1.2 mg/dL Final    Total Protein 09/01/2022 7.4  6.4 - 8.3 g/dL Final    Albumin 09/01/2022 4.7  3.5 - 5.2 g/dL Final    GFR Non- 09/01/2022 >60  >60 mL/min Final    GFR  09/01/2022 >60  >60 mL/min Final    GFR Comment 09/01/2022        Final    Comment: Average GFR for 52-63 years old:   80 mL/min/1.73sq m  Chronic Kidney Disease:   <60 mL/min/1.73sq m  Kidney failure:   <15 mL/min/1.73sq m              eGFR calculated using average adult body mass. Additional eGFR calculator available at:        [employer]                 Reviewed patient's current plan of care and vital signs with nursing staff. Labs reviewed: [x] Yes    Medications  Current Facility-Administered Medications: risperiDONE (RISPERDAL) tablet 0.5 mg, 0.5 mg, Oral, BID  acetaminophen (TYLENOL) tablet 650 mg, 650 mg, Oral, Q6H PRN  ibuprofen (ADVIL;MOTRIN) tablet 400 mg, 400 mg, Oral, Q6H PRN  hydrOXYzine HCl (ATARAX) tablet 50 mg, 50 mg, Oral, TID PRN  traZODone (DESYREL) tablet 50 mg, 50 mg, Oral, Nightly PRN  polyethylene glycol (GLYCOLAX) packet 17 g, 17 g, Oral, Daily PRN  aluminum & magnesium hydroxide-simethicone (MAALOX) 200-200-20 MG/5ML suspension 30 mL, 30 mL, Oral, Q6H PRN  nicotine polacrilex (NICORETTE) gum 2 mg, 2 mg, Oral, Q2H PRN  haloperidol lactate (HALDOL) injection 5 mg, 5 mg, IntraMUSCular, Q6H PRN **AND** diphenhydrAMINE (BENADRYL) injection 50 mg, 50 mg, IntraMUSCular, Q6H PRN  haloperidol (HALDOL) tablet 5 mg, 5 mg, Oral, Q6H PRN    ASSESSMENT  Acute psychosis ([facility] [address]         HANDOFF  Patient symptoms: modestly improving   Medications as determined by attending physician   Encourage participation in groups and milieu. Probable discharge is to be determined by MD    Electronically signed by REJI Wilson CNP on 9/2/2022 at 2:32 PM    **This report has been created using voice recognition software. It may contain minor errors which are inherent in voice recognition technology. **    I independently saw and evaluated the patient. I reviewed the nurse practitioners documentation above. Any additional comments or changes to the nurse practitioners documentation are stated below otherwise agree with assessment. Plan will be as follows:  Still trying to obtain outside history.    was able to identify her PhD psychologist.  We will approach the patient with [facility] for trying to obtain records to determine whether this is an acute process versus chronic. Patient is stable on the inpatient unit, still need to clarify history in order to determine if this is an acute process versus a chronic condition  PLAN  Patient s symptoms   behavior improved and stable on the unit, delusions may be fixed  Increase Risperdal to 1 mg p.o. twice daily  Attempt to develop insight  Psycho-education conducted. Supportive Therapy conducted.   Probable discharge is next week  Follow-up daily while on inpatient unit

## 2022-09-02 NOTE — PLAN OF CARE
Problem: Psychosis  Goal: Will report no hallucinations or delusions  Description: INTERVENTIONS:  1. Administer medication as  ordered  2. Assist with reality testing to support increasing orientation  3. Assess if patient's hallucinations or delusions are encouraging self harm or harm to others and intervene as appropriate  9/2/2022 0130 by Aylin Garza RN  Outcome: Progressing     Problem: Involuntary Admit  Goal: Will cooperate with staff recommendations and doctor's orders and will demonstrate appropriate behavior  Description: INTERVENTIONS:  1. Treat underlying conditions and offer medication as ordered  2. Educate regarding involuntary admission procedures and rules  3. Contain excessive/inappropriate behavior per unit and hospital policies  4/8/5213 4018 by Aylin Garza RN  Outcome: Progressing     Problem: Nutrition Deficit:  Goal: Optimize nutritional status  9/2/2022 0130 by Aylin Garza RN  Outcome: Progressing     Patient remains unstable; presenting with paranoid delusions. Patient believes her and her family are in danger as if someone or something is after them. Patient remains in behavorial control. Patient remains free from self harm or injury. Patient denies thoughts of self harm or thoughts to harm others. Patient denies hallucinations. Patient remains compliant with medications and states she is beginning to feel more \"clear\". Patient encouraged to attend all scheduled unit programming. A safe environment and Q 15 min checks maintained. Jazlyn Finders will continue to monitor the patient's physical and mental status.

## 2022-09-02 NOTE — PROGRESS NOTES
Transfer note  Pt transferred to CD, all belongings and medication went with patient, report given to staff

## 2022-09-02 NOTE — GROUP NOTE
Group Therapy Note    Date: 9/2/2022    Group Start Time: 1100  Group End Time: 1906  Group Topic: Cognitive Skills    [facility]    ETIENNE Wilkes        Group Therapy Note    Attendees: 11/21     Patient's Goal:  To increase social interaction and to practice problem solving, deductive reasoning, and communication skills. Notes: Pt attended and participated fully in group task . Pt was able to practice problem solving,  deductive reasoning, and communication skills. Status After Intervention: Improved     Participation Level: Active Listener,  sharing ,supportive     Participation Quality:  Attentive, sharing , supportive     Speech:  Normal     Thought Process/Content: Logical, linear r/t task     Affective Functioning: Congruent, brightens     Mood: Euthymic, engaged in task, enthusiastic r/t task .      Level of consciousness:  Alert, and Attentive        Response to Learning:  Able to verbalize current knowledge , Able to acknowledge new learning, and Progressing to goal        Endings: None Reported     Modes of Intervention: Education, Support, Socialization, Exploration, Clarifying and Problem-solving        Discipline Responsible: Psychoeducational Specialist        Signature:  ETIENNE Mercedes

## 2022-09-02 NOTE — GROUP NOTE
Group Therapy Note    Date: 9/2/2022    Group Start Time: 0900  Group End Time: 0930  Group Topic: Group Documentation    [facility]    Renay Bhatt RN        Group Therapy Note    Attendees: 8/21       Patient's Goal:  Talk with  about belongings and housing after discharge, focus on sonal and gratitude    Notes:  Goals group    Status After Intervention:  Unchanged    Participation Level:  Active Listener and Interactive    Participation Quality: Appropriate, Attentive, and Sharing      Speech:  normal      Thought Process/Content: Logical  Linear      Affective Functioning: Congruent      Mood: anxious      Level of consciousness:  Alert and Oriented x4      Response to Learning: Able to verbalize current knowledge/experience, Able to verbalize/acknowledge new learning, and Able to retain information      Endings: None Reported    Modes of Intervention: support, socialization, exploration      Discipline Responsible: Behavorial Health Tech      Signature:  Renay Bhatt RN

## 2022-09-02 NOTE — PLAN OF CARE
Problem: Psychosis  Goal: Will report no hallucinations or delusions  Description: INTERVENTIONS:  1. Administer medication as  ordered  2. Assist with reality testing to support increasing orientation  3. Assess if patient's hallucinations or delusions are encouraging self harm or harm to others and intervene as appropriate  9/2/2022 1342 by Marta Lucas RN  Outcome: Progressing     Problem: Involuntary Admit  Goal: Will cooperate with staff recommendations and doctor's orders and will demonstrate appropriate behavior  Description: INTERVENTIONS:  1. Treat underlying conditions and offer medication as ordered  2. Educate regarding involuntary admission procedures and rules  3. Contain excessive/inappropriate behavior per unit and hospital policies  3/1/5479 8694 by Marta Lucas RN  Outcome: Progressing     Patient is open to 1:1 talk time with staff. Patient denies depression, anxiety, suicidal ideations, homicidal ideations, auditory hallucinations, and visual hallucinations. Patient is out at all meal times and medication compliant. Patient attends groups and is out and selectively social in the day area.

## 2022-09-02 NOTE — GROUP NOTE
Group Therapy Note    Date: 9/2/2022    Group Start Time: 1430  Group End Time: 1925  Group Topic: Cognitive Skills    [facility]    ETIENNE Lockett        Group Therapy Note    Attendees: 10/16     Patient's Goal:  To increase social interaction and to practice expressing feelings, and explore positive coping skills, resources,stress management, and communication skills             Notes: Pt attended group and participated fully in group task et discussion. Pt was able to practice expressing feelings, and explore positive coping skills, resources,stress management, and communication skills through creative expression and discussion. Pt shared individually and was supportive of peers, and able to relate to some of their ideas and feelings,accepting of their support in group. Status After Intervention: Improved     Participation Level:  Attentive, sharing, supportive     Participation Quality:  Active listening, sharing, supportive     Speech:  Normal     Thought Process/Content: Logical et linear     Affective Functioning: Congruent during drawing and discussion, tearful briefly when sharing about moving forward away from negative relationship     Mood: Euthymic for most of group, expressed feelings r/t past negative relationship briefly but appropriately, able to identify future goals     Level of consciousness:  Alert, and Attentive        Response to Learning:  Able to verbalize current knowledge, able to verbalize/acknowledge new learning, able to identify insight, and Progressing to goal        Endings: None Reported     Modes of Intervention: Education, Support, Socialization, Exploration, Clarifying and Problem-solving        Discipline Responsible: Psychoeducational Specialist        Signature:  ETIENNE Infante

## 2022-09-03 PROCEDURE — 6370000000 HC RX 637 (ALT 250 FOR IP): Performed by: PSYCHIATRY & NEUROLOGY

## 2022-09-03 PROCEDURE — 99232 SBSQ HOSP IP/OBS MODERATE 35: CPT | Performed by: PSYCHIATRY & NEUROLOGY

## 2022-09-03 PROCEDURE — APPSS30 APP SPLIT SHARED TIME 16-30 MINUTES: Performed by: NURSE PRACTITIONER

## 2022-09-03 PROCEDURE — 1240000000 HC EMOTIONAL WELLNESS R&B

## 2022-09-03 RX ORDER — RISPERIDONE 1 MG/1
1 TABLET, FILM COATED ORAL 2 TIMES DAILY
Status: DISCONTINUED | OUTPATIENT
Start: 2022-09-03 | End: 2022-09-05

## 2022-09-03 RX ADMIN — RISPERIDONE 1 MG: 1 TABLET ORAL at 08:07

## 2022-09-03 RX ADMIN — RISPERIDONE 1 MG: 1 TABLET ORAL at 21:11

## 2022-09-03 RX ADMIN — ACETAMINOPHEN 650 MG: 325 TABLET, FILM COATED ORAL at 21:12

## 2022-09-03 RX ADMIN — ACETAMINOPHEN 650 MG: 325 TABLET, FILM COATED ORAL at 10:40

## 2022-09-03 ASSESSMENT — PAIN SCALES - GENERAL
PAINLEVEL_OUTOF10: 3

## 2022-09-03 ASSESSMENT — PAIN DESCRIPTION - DESCRIPTORS
DESCRIPTORS: ACHING;PRESSURE
DESCRIPTORS: DISCOMFORT;PRESSURE;ACHING

## 2022-09-03 ASSESSMENT — PAIN DESCRIPTION - LOCATION
LOCATION: RIB CAGE
LOCATION: RIB CAGE

## 2022-09-03 NOTE — PLAN OF CARE
Problem: Psychosis  Goal: Will report no hallucinations or delusions  Description: INTERVENTIONS:  1. Administer medication as  ordered  2. Assist with reality testing to support increasing orientation  3. Assess if patient's hallucinations or delusions are encouraging self harm or harm to others and intervene as appropriate  9/2/2022 2104 by Ivette Miller RN  Outcome: Progressing   Paranoid delusions without +effect from injection of doubt. Problem: Involuntary Admit  Goal: Will cooperate with staff recommendations and doctor's orders and will demonstrate appropriate behavior  Description: INTERVENTIONS:  1. Treat underlying conditions and offer medication as ordered  2. Educate regarding involuntary admission procedures and rules  3. Contain excessive/inappropriate behavior per unit and hospital policies  9/3/8520 7327 by Ivette Miller RN  Outcome: Progressing   She is quiet, guarded, & aloof but cooperative/pleasant on approach. Problem: Nutrition Deficit:  Goal: Optimize nutritional status  Outcome: Progressing   Ate snack, drinking well.

## 2022-09-03 NOTE — PLAN OF CARE
Problem: Psychosis  Goal: Will report no hallucinations or delusions  Description: INTERVENTIONS:  1. Administer medication as  ordered  2. Assist with reality testing to support increasing orientation  3. Assess if patient's hallucinations or delusions are encouraging self harm or harm to others and intervene as appropriate  Outcome: Progressing   Patient denied auditory, visual, tactile, gustatory, and olfactory hallucinations. Patient is having paranoid delusions. Patient is taking prescribed medications. Problem: Nutrition Deficit:  Goal: Optimize nutritional status  Outcome: Progressing   Patient is eating meals, and reports that her appetite is adequate. Problem: Pain  Goal: Verbalizes/displays adequate comfort level or baseline comfort level  Outcome: Progressing   Patient reports of pain in her ribs. Patient is aware of medications to help relieve pain. Writer encouraged patient to use nonpharmacological approaches to reduce pain.

## 2022-09-03 NOTE — GROUP NOTE
Group Therapy Note    Date: 9/3/2022    Group Start Time: 0930  Group End Time: 9673  Group Topic: Community Meeting    SAY Dominguez        Group Therapy Note    Attendees: 5/18       Patient's Goal:  Talk with doctor, get release signed for my medical records    Notes:  controlled    Status After Intervention:  Unchanged    Participation Level: Active Listener and Interactive    Participation Quality: Appropriate and Attentive      Speech:  normal      Thought Process/Content: Logical      Affective Functioning: Congruent      Mood: anxious      Level of consciousness:  Alert and Attentive      Response to Learning: Able to verbalize current knowledge/experience and Progressing to goal      Endings: None Reported    Modes of Intervention: Education, Support, and Socialization      Discipline Responsible: Behavorial Health Tech      Signature:   Rasheed Dominguez

## 2022-09-03 NOTE — PROGRESS NOTES
Daily Progress Note  9/3/2022    Patient Name: Rodo Fox    CHIEF COMPLAINT: Acute psychosis         SUBJECTIVE:    Patient was seen for follow-up assessment today. Patient has been medication adherent and behaviorally in control. She has not required any emergency medications in the last 24 hours. Patient was observed in the day area after taking a shower and has been attending to ADLs. She was pleasant on approach and cooperative and agreeable to assessment in privacy of her room. Patient reports that she is \"as good as can be expected\". She is asked to identify any improvement that she has noticed in herself and she continues to report that she is feeling calmer. She endorses feeling anxious and restless at times. Delusions remain fixed regarding the safety of her children and her 's involvement with the [employer]. She becomes tearful when discussing her children and she continues to worry about them a great deal.  he has been social with peers and she states she has been playing cards and attending groups in the milieu. She is denying any perceptual disturbances. She denies suicidal and homicidal ideation. Appetite:  [x] Adequate/Unchanged  [] Increased  [] Decreased      Sleep:       [x] Adequate/Unchanged  [] Fair  [] Poor      Group Attendance on Unit:   [] Yes   [x] Selectively    [] No    Medication Side Effects: Denies         Mental Status Exam  Level of consciousness: Alert and awake. Appearance: Appropriate attire for setting, seated on bed, with good grooming and hygiene. Behavior/Motor: Approachable, calm, tearful, compliant with interview  Attitude toward examiner: Cooperative, attentive, good eye contact. Speech: normal rate and normal volume, depressed tone  Mood: Depressed  Affect: Blunted  Thought processes: goal directed, coherent, and illogical.   Thought content: Denies homicidal ideation. Suicidal Ideation: Denies suicidal ideations.   Delusions: Patient presents with delusions. Endorses paranoia, but states she does not believe these are irrational thoughts. Perceptual Disturbance: Patient does not appear to be responding to internal stimuli. Denies auditory hallucinations. Denies visual hallucinations. Cognition: Oriented to self, location, time, and situation. Memory: Impaired. Insight & Judgement: Poor    Data   height is 5' 4\" (1.626 m) and weight is 150 lb (68 kg). Her temporal temperature is 97.9 °F (36.6 °C). Her blood pressure is 120/92 (abnormal) and her pulse is 98. Her respiration is 14 and oxygen saturation is 98%. Labs:   Admission on 08/29/2022   Component Date Value Ref Range Status    Glucose 09/01/2022 114 (A) 70 - 99 mg/dL Final    BUN 09/01/2022 23 (A) 6 - 20 mg/dL Final    Creatinine 09/01/2022 0.77  0.50 - 0.90 mg/dL Final    Calcium 09/01/2022 9.9  8.6 - 10.4 mg/dL Final    Sodium 09/01/2022 139  135 - 144 mmol/L Final    Potassium 09/01/2022 3.8  3.7 - 5.3 mmol/L Final    Chloride 09/01/2022 102  98 - 107 mmol/L Final    CO2 09/01/2022 25  20 - 31 mmol/L Final    Anion Gap 09/01/2022 12  9 - 17 mmol/L Final    Alkaline Phosphatase 09/01/2022 84  35 - 104 U/L Final    ALT 09/01/2022 36 (A) 5 - 33 U/L Final    AST 09/01/2022 33 (A) <32 U/L Final    Total Bilirubin 09/01/2022 0.56  0.3 - 1.2 mg/dL Final    Total Protein 09/01/2022 7.4  6.4 - 8.3 g/dL Final    Albumin 09/01/2022 4.7  3.5 - 5.2 g/dL Final    GFR Non- 09/01/2022 >60  >60 mL/min Final    GFR  09/01/2022 >60  >60 mL/min Final    GFR Comment 09/01/2022        Final    Comment: Average GFR for 52-63 years old:   80 mL/min/1.73sq m  Chronic Kidney Disease:   <60 mL/min/1.73sq m  Kidney failure:   <15 mL/min/1.73sq m              eGFR calculated using average adult body mass.  Additional eGFR calculator available at:        [employer]                 Reviewed patient's current plan of care and vital signs with nursing staff. Labs reviewed: [x] Yes    Medications  Current Facility-Administered Medications: risperiDONE (RISPERDAL) tablet 1 mg, 1 mg, Oral, BID  acetaminophen (TYLENOL) tablet 650 mg, 650 mg, Oral, Q6H PRN  ibuprofen (ADVIL;MOTRIN) tablet 400 mg, 400 mg, Oral, Q6H PRN  hydrOXYzine HCl (ATARAX) tablet 50 mg, 50 mg, Oral, TID PRN  traZODone (DESYREL) tablet 50 mg, 50 mg, Oral, Nightly PRN  polyethylene glycol (GLYCOLAX) packet 17 g, 17 g, Oral, Daily PRN  aluminum & magnesium hydroxide-simethicone (MAALOX) 200-200-20 MG/5ML suspension 30 mL, 30 mL, Oral, Q6H PRN  nicotine polacrilex (NICORETTE) gum 2 mg, 2 mg, Oral, Q2H PRN  haloperidol lactate (HALDOL) injection 5 mg, 5 mg, IntraMUSCular, Q6H PRN **AND** diphenhydrAMINE (BENADRYL) injection 50 mg, 50 mg, IntraMUSCular, Q6H PRN  haloperidol (HALDOL) tablet 5 mg, 5 mg, Oral, Q6H PRN    ASSESSMENT  Acute psychosis ([facility] [address]         HANDOFF  Patient symptoms: Show no change today  Medications as determined by attending physician   Encourage participation in groups and milieu. Probable discharge is to be determined by MD    Electronically signed by REJI Castillo CNP on 9/3/2022 at 4:27 PM    **This report has been created using voice recognition software. It may contain minor errors which are inherent in voice recognition technology. **      I independently saw and evaluated the patient. I reviewed the nurse practitioners documentation above. Any additional comments or changes to the nurse practitioners documentation are stated below otherwise agree with assessment. Plan will be as follows:  Patient reports feeling a little tired on the medication. Still compliant with medication. Going to jeopardize therapeutic alliance.   We will hold on current medication until patient feels confident with the medication or we may need to change if she continues to feel tired or sedated  PLAN  Patient s symptoms   show no change  Monitor on current meds for now  Attempt to develop insight  Psycho-education conducted. Supportive Therapy conducted.   Probable discharge is next week  Follow-up daily while on inpatient unit

## 2022-09-04 PROCEDURE — 1240000000 HC EMOTIONAL WELLNESS R&B

## 2022-09-04 PROCEDURE — 99231 SBSQ HOSP IP/OBS SF/LOW 25: CPT | Performed by: NURSE PRACTITIONER

## 2022-09-04 PROCEDURE — 6370000000 HC RX 637 (ALT 250 FOR IP): Performed by: PSYCHIATRY & NEUROLOGY

## 2022-09-04 RX ADMIN — ACETAMINOPHEN 650 MG: 325 TABLET, FILM COATED ORAL at 08:57

## 2022-09-04 RX ADMIN — RISPERIDONE 1 MG: 1 TABLET ORAL at 08:55

## 2022-09-04 RX ADMIN — ACETAMINOPHEN 650 MG: 325 TABLET, FILM COATED ORAL at 20:50

## 2022-09-04 RX ADMIN — RISPERIDONE 1 MG: 1 TABLET ORAL at 20:48

## 2022-09-04 ASSESSMENT — PAIN SCALES - GENERAL
PAINLEVEL_OUTOF10: 0
PAINLEVEL_OUTOF10: 3
PAINLEVEL_OUTOF10: 6
PAINLEVEL_OUTOF10: 5
PAINLEVEL_OUTOF10: 0

## 2022-09-04 ASSESSMENT — PAIN DESCRIPTION - DESCRIPTORS: DESCRIPTORS: DISCOMFORT

## 2022-09-04 ASSESSMENT — PAIN DESCRIPTION - ORIENTATION: ORIENTATION: RIGHT

## 2022-09-04 ASSESSMENT — PAIN DESCRIPTION - LOCATION: LOCATION: OTHER (COMMENT)

## 2022-09-04 ASSESSMENT — PAIN - FUNCTIONAL ASSESSMENT: PAIN_FUNCTIONAL_ASSESSMENT: ACTIVITIES ARE NOT PREVENTED

## 2022-09-04 NOTE — PROGRESS NOTES
Daily Progress Note  9/4/2022    Patient Name: Lena Lucio    CHIEF COMPLAINT: Acute psychosis         SUBJECTIVE:    Patient was seen for follow-up assessment today. Patient has been medication adherent and behaviorally in control. She has not required any emergency medications in the last 24 hours. Patient was resting in bed on approach and presented as somnolent. She reports being up for most of the morning and continues to be social and active in the milieu. She did not want to get up and relocate to unit sensory room but wanted to stay resting in bed for the time being. She just felt the need to rest for a little while. Her slightly mood is improved and she shares that she continues to work with the  to locate phone numbers of various family members. A message has been left with her nephew in [place] and she is currently waiting for a return call from him. She is less distressed today and does not become tearful when talking with writer. She does not make any outwardly delusional statements today and seems to be more organized in thought process. She is denying auditory and visual hallucinations. She denies suicidal and homicidal ideations. Patient was eager to rest and assessment was therefore ended. Patient has yet to demonstrate sustained stability and warrants further hospitalization for safety and stabilization. Appetite:  [x] Adequate/Unchanged  [] Increased  [] Decreased      Sleep:       [x] Adequate/Unchanged  [] Fair  [] Poor      Group Attendance on Unit:   [] Yes   [x] Selectively    [] No    Medication Side Effects: Denies         Mental Status Exam  Level of consciousness: Somnolent, becomes alert and awake  Appearance: Appropriate attire for setting, resting in bed, with good grooming and hygiene. Behavior/Motor: Approachable, calm, compliant with interview  Attitude toward examiner: Cooperative, attentive, good eye contact.   Speech: normal rate and decreased volume, depressed tone  Mood: Depressed, improving  Affect: Blunted  Thought processes: Linear and coherent  Thought content: Denies homicidal ideation. Suicidal Ideation: Denies suicidal ideations. Delusions: Paranoid; modestly improving  Perceptual Disturbance: Patient does not appear to be responding to internal stimuli. Denies auditory hallucinations. Denies visual hallucinations. Cognition: Oriented to self, location, time, and situation. Memory: Impaired. Insight & Judgement: Poor    Data   height is 5' 4\" (1.626 m) and weight is 150 lb (68 kg). Her temporal temperature is 97.2 °F (36.2 °C). Her blood pressure is 131/85 and her pulse is 84. Her respiration is 14 and oxygen saturation is 98%. Labs:   Admission on 08/29/2022   Component Date Value Ref Range Status    Glucose 09/01/2022 114 (A) 70 - 99 mg/dL Final    BUN 09/01/2022 23 (A) 6 - 20 mg/dL Final    Creatinine 09/01/2022 0.77  0.50 - 0.90 mg/dL Final    Calcium 09/01/2022 9.9  8.6 - 10.4 mg/dL Final    Sodium 09/01/2022 139  135 - 144 mmol/L Final    Potassium 09/01/2022 3.8  3.7 - 5.3 mmol/L Final    Chloride 09/01/2022 102  98 - 107 mmol/L Final    CO2 09/01/2022 25  20 - 31 mmol/L Final    Anion Gap 09/01/2022 12  9 - 17 mmol/L Final    Alkaline Phosphatase 09/01/2022 84  35 - 104 U/L Final    ALT 09/01/2022 36 (A) 5 - 33 U/L Final    AST 09/01/2022 33 (A) <32 U/L Final    Total Bilirubin 09/01/2022 0.56  0.3 - 1.2 mg/dL Final    Total Protein 09/01/2022 7.4  6.4 - 8.3 g/dL Final    Albumin 09/01/2022 4.7  3.5 - 5.2 g/dL Final    GFR Non- 09/01/2022 >60  >60 mL/min Final    GFR  09/01/2022 >60  >60 mL/min Final    GFR Comment 09/01/2022        Final    Comment: Average GFR for 52-63 years old:   80 mL/min/1.73sq m  Chronic Kidney Disease:   <60 mL/min/1.73sq m  Kidney failure:   <15 mL/min/1.73sq m              eGFR calculated using average adult body mass.  Additional eGFR calculator available at: [facility]                 Reviewed patient's current plan of care and vital signs with nursing staff. Labs reviewed: [x] Yes    Medications  Current Facility-Administered Medications: risperiDONE (RISPERDAL) tablet 1 mg, 1 mg, Oral, BID  acetaminophen (TYLENOL) tablet 650 mg, 650 mg, Oral, Q6H PRN  ibuprofen (ADVIL;MOTRIN) tablet 400 mg, 400 mg, Oral, Q6H PRN  hydrOXYzine HCl (ATARAX) tablet 50 mg, 50 mg, Oral, TID PRN  traZODone (DESYREL) tablet 50 mg, 50 mg, Oral, Nightly PRN  polyethylene glycol (GLYCOLAX) packet 17 g, 17 g, Oral, Daily PRN  aluminum & magnesium hydroxide-simethicone (MAALOX) 200-200-20 MG/5ML suspension 30 mL, 30 mL, Oral, Q6H PRN  nicotine polacrilex (NICORETTE) gum 2 mg, 2 mg, Oral, Q2H PRN  haloperidol lactate (HALDOL) injection 5 mg, 5 mg, IntraMUSCular, Q6H PRN **AND** diphenhydrAMINE (BENADRYL) injection 50 mg, 50 mg, IntraMUSCular, Q6H PRN  haloperidol (HALDOL) tablet 5 mg, 5 mg, Oral, Q6H PRN    ASSESSMENT  Acute psychosis (HCC)           PLAN  Patient symptoms: Modestly improving  Continue current medication regimen for now per attending physician  Monitor need and frequency of PRN medications. Encourage participation in groups and milieu. Attempt to develop insight. Psycho-education conducted. Supportive Therapy conducted. Probable discharge is per attending physician. Follow-up daily while inpatient. Patient continues to be monitored in the inpatient psychiatric facility at [facility] for safety and stabilization. Patient continues to need, on a daily basis, active treatment furnished directly by or requiring the supervision of inpatient psychiatric personnel. Electronically signed by REJI Jennings CNP on 9/4/2022 at 6:44 PM    **This report has been created using voice recognition software. It may contain minor errors which are inherent in voice recognition technology. **

## 2022-09-04 NOTE — GROUP NOTE
Group Therapy Note    Date: 9/4/2022    Group Start Time: 1400  Group End Time: 0864  Group Topic: Music Therapy    [facility]    Rut Anderson        Group Therapy Note    Attendees: 9/19     Patient's Goal:  Patients were offered a variety of topics for music group, and expressed desire to share music and discuss topics based on their selections. Patients were offered opportunity to share their thoughts on music and answer questions asked by this writer covering a variety of topics including substance use; emotions; relationships; leisure interests and more. Goals to increase sense of community; Increase self-expression; Normalization of the environment; Increase socialization    Notes:  Patient attended and participated in group having positive interactions with peers and staff throughout. Patient shared music and discussed meditation, moments of silence, and \"slowing down\" to be present-minded    Status After Intervention:  Improved    Participation Level:  Active Listener and Interactive    Participation Quality: Appropriate, Attentive, Sharing, and Supportive      Speech:  normal      Thought Process/Content: Logical  Linear      Affective Functioning: Congruent      Mood: euthymic      Level of consciousness:  Alert and Attentive      Response to Learning: Able to verbalize current knowledge/experience, Capable of insight, and Progressing to goal      Endings: None Reported    Modes of Intervention: Support, Socialization, Exploration, Activity, Media, and Reality-testing      Discipline Responsible: Psychoeducational Specialist      Signature:  Rut Anderson

## 2022-09-04 NOTE — GROUP NOTE
Group Therapy Note    Date: 9/4/2022    Group Start Time: 0900  Group End Time: 8869  Group Topic: Community Meeting    SAY Mcbride        Group Therapy Note    Attendees: 7/14       Patient's Goal:  Work on journaling and coping skills     Status After Intervention:  Unchanged    Participation Level:  Active Listener and Interactive    Participation Quality: Appropriate and Attentive      Speech:  normal      Thought Process/Content: Logical      Affective Functioning: Congruent      Mood: euthymic      Level of consciousness:  Alert and Oriented x4      Response to Learning: Able to verbalize current knowledge/experience and Able to verbalize/acknowledge new learning      Endings: None Reported    Modes of Intervention: Education and Support      Discipline Responsible: Behavorial Health Tech      Signature:  Bony Mcbride

## 2022-09-04 NOTE — GROUP NOTE
Group Therapy Note    Date: 9/3/2022    Group Start Time: 2000  Group End Time: 2025  Group Topic: Relaxation    [facility]    Kayla Bardales RN      Group Therapy Note    Attendees: 10/18       Pt refused to attend Relaxation group this evening after encouragement from staff. 1:1 talk time offered but pt declined. Will continue to encourage patient to attend unit group programming.         Signature:  Kayla Bardales RN

## 2022-09-04 NOTE — PLAN OF CARE
Problem: Psychosis  Goal: Will report no hallucinations or delusions  Description: INTERVENTIONS:  1. Administer medication as  ordered  2. Assist with reality testing to support increasing orientation  3. Assess if patient's hallucinations or delusions are encouraging self harm or harm to others and intervene as appropriate  9/4/2022 0849 by Nadir Roche  Outcome: Progressing  Note: Patient is accepting of 1:1 talk time with staff. Patient is eating and sleeping well. Patient states she slept well. Patient denies suicidal and homicidal ideation and auditory and visual hallucinations and verbally agrees to approach staff if thoughts of self harm arises. Patient is out, aloof of peers. Patient is medication compliant. Reassurance and support provided. Q15 minute checks maintained. Patient remains safe at this time.

## 2022-09-05 PROCEDURE — 6370000000 HC RX 637 (ALT 250 FOR IP): Performed by: PSYCHIATRY & NEUROLOGY

## 2022-09-05 PROCEDURE — 90833 PSYTX W PT W E/M 30 MIN: CPT | Performed by: PSYCHIATRY & NEUROLOGY

## 2022-09-05 PROCEDURE — 1240000000 HC EMOTIONAL WELLNESS R&B

## 2022-09-05 PROCEDURE — APPSS30 APP SPLIT SHARED TIME 16-30 MINUTES

## 2022-09-05 PROCEDURE — 99232 SBSQ HOSP IP/OBS MODERATE 35: CPT | Performed by: PSYCHIATRY & NEUROLOGY

## 2022-09-05 RX ADMIN — ACETAMINOPHEN 650 MG: 325 TABLET, FILM COATED ORAL at 07:32

## 2022-09-05 RX ADMIN — RISPERIDONE 1 MG: 1 TABLET ORAL at 20:51

## 2022-09-05 RX ADMIN — RISPERIDONE 1 MG: 1 TABLET ORAL at 07:31

## 2022-09-05 RX ADMIN — IBUPROFEN 400 MG: 400 TABLET ORAL at 21:03

## 2022-09-05 RX ADMIN — ACETAMINOPHEN 650 MG: 325 TABLET, FILM COATED ORAL at 13:29

## 2022-09-05 ASSESSMENT — PAIN SCALES - GENERAL
PAINLEVEL_OUTOF10: 5
PAINLEVEL_OUTOF10: 3
PAINLEVEL_OUTOF10: 7

## 2022-09-05 ASSESSMENT — LIFESTYLE VARIABLES: HOW OFTEN DO YOU HAVE A DRINK CONTAINING ALCOHOL: NEVER

## 2022-09-05 ASSESSMENT — PAIN DESCRIPTION - LOCATION
LOCATION: RIB CAGE
LOCATION: RIB CAGE

## 2022-09-05 ASSESSMENT — PAIN - FUNCTIONAL ASSESSMENT: PAIN_FUNCTIONAL_ASSESSMENT: ACTIVITIES ARE NOT PREVENTED

## 2022-09-05 ASSESSMENT — PAIN DESCRIPTION - DESCRIPTORS
DESCRIPTORS: ACHING
DESCRIPTORS: ACHING

## 2022-09-05 ASSESSMENT — PAIN DESCRIPTION - ORIENTATION
ORIENTATION: LEFT
ORIENTATION: LEFT

## 2022-09-05 NOTE — PLAN OF CARE
Problem: Psychosis  Goal: Will report no hallucinations or delusions  Description: INTERVENTIONS:  1. Administer medication as  ordered  2. Assist with reality testing to support increasing orientation  3. Assess if patient's hallucinations or delusions are encouraging self harm or harm to others and intervene as appropriate  9/5/2022 1032 by Deysi Andrews RN  Outcome: Progressing   1:1 with pt x ten minutes. Pt encouraged to attend unit programming and interact with peers and staff. Pt also encouraged to tend to hygiene and ADLs. Pt encouraged to discuss feelings with staff and feedback and reassurance provided. Pt denies thoughts of self harm and is agreeable to seeking out should thoughts of self harm arise. Safe environment maintained. Q15 minute checks for safety cont per unit policy. Will cont to monitor for safety and provides support and reassurance as needed. Pt attends groups et is social with select staff. Lacks insight et does admit to feeling anxious at times. Was compliant with medications.
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
## 2022-09-05 NOTE — GROUP NOTE
Group Therapy Note    Date: 9/5/2022    Group Start Time: 1400  Group End Time: 5438  Group Topic: Cognitive Skills    SAY Posadas, [place]        Group Therapy Note    Attendees: 9/19     Cognitive Skills Group Note     Date: 9/5/2022          Start Time: 14:00                      End Time: 14:50     Number of Participants in Group & Unit Census:     9/19        Topic: Decision making, and communication skills. Goal of Group: To increase social interaction and to practice decision making, and communication skills. Comments: Patient did not participate in Cognitive Skills group, despite staff encouragement and explanation of benefits. Patient remains seclusive to self during group time. Q15 minute safety checks maintained for patient safety and will continue to encourage patient to attend unit programming.            Discipline Responsible: Psychoeducational Specialist        Signature:  Ronen Banegas

## 2022-09-05 NOTE — GROUP NOTE
Group Therapy Note    Date: 9/5/2022    Group Start Time: 8263  Group End Time: 1130  Group Topic: Psychoeducation    [place]        Group Therapy Note    Attendees: 7/20       Patient's Goal:  identifying strengths     Notes:  therapeutic worksheet provided and discussed     Status After Intervention:  Improved    Participation Level:  Active Listener and Interactive    Participation Quality: Appropriate and Attentive      Speech:  normal      Thought Process/Content: Logical      Affective Functioning: Congruent      Mood: euthymic      Level of consciousness:  Alert and Oriented x4      Response to Learning: Able to verbalize current knowledge/experience and Able to verbalize/acknowledge new learning      Endings: None Reported    Modes of Intervention: Education and Support      Discipline Responsible: /Counselor      Signature:  CINDY Hightower
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
## 2022-09-05 NOTE — GROUP NOTE
Group Therapy Note    Date: 9/5/2022    Group Start Time: 0900  Group End Time: 0930  Group Topic: Group Documentation    [facility]    Brendon Matias LPN        Group Therapy Note    Attendees: 7/20       Patient's Goal:  educate    Notes:  inspire    Status After Intervention:  Improved    Participation Level:  Active Listener    Participation Quality: Appropriate      Speech:  normal      Thought Process/Content: Logical      Affective Functioning: Flat      Mood: depressed      Level of consciousness:  Alert      Response to Learning: Progressing to goal      Endings: None Reported    Modes of Intervention: Education      Discipline Responsible: Licensed Practical Nurse      Signature:  Brendon Matias LPN
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
## 2022-09-05 NOTE — GROUP NOTE
Group Therapy Note    Date: 9/4/2022    Group Start Time: 2030  Group End Time: 2100  Group Topic: Wrap-Up    [facility] ELSI BERGERON    Justin Akhtar        Group Therapy Note    Attendees: 13/20    Patients completed evening wrap-up and relaxation group. Organic coping skills to cope with hospitalization and other stressors were discussed such as television, coloring, puzzles, and reaching out to supportive friends and family members. Status After Intervention:  Unchanged    Participation Level:  Active Listener    Participation Quality: Appropriate      Speech:  normal      Thought Process/Content: Logical      Affective Functioning: Congruent      Mood: depressed      Level of consciousness:  Alert and Oriented x4      Response to Learning: Progressing to goal      Endings: None Reported    Modes of Intervention: Education, Support, Socialization, and Exploration      Discipline Responsible: Behavorial Health Tech      Signature:  Justin Akhtar

## 2022-09-06 PROCEDURE — 6370000000 HC RX 637 (ALT 250 FOR IP): Performed by: PSYCHIATRY & NEUROLOGY

## 2022-09-06 PROCEDURE — 99232 SBSQ HOSP IP/OBS MODERATE 35: CPT | Performed by: PSYCHIATRY & NEUROLOGY

## 2022-09-06 PROCEDURE — APPSS30 APP SPLIT SHARED TIME 16-30 MINUTES

## 2022-09-06 PROCEDURE — 1240000000 HC EMOTIONAL WELLNESS R&B

## 2022-09-06 PROCEDURE — 90833 PSYTX W PT W E/M 30 MIN: CPT | Performed by: PSYCHIATRY & NEUROLOGY

## 2022-09-06 RX ADMIN — IBUPROFEN 400 MG: 400 TABLET ORAL at 21:12

## 2022-09-06 RX ADMIN — RISPERIDONE 1.5 MG: 1 TABLET ORAL at 21:09

## 2022-09-06 RX ADMIN — RISPERIDONE 1.5 MG: 1 TABLET ORAL at 07:56

## 2022-09-06 RX ADMIN — IBUPROFEN 400 MG: 400 TABLET ORAL at 14:53

## 2022-09-06 RX ADMIN — IBUPROFEN 400 MG: 400 TABLET ORAL at 07:03

## 2022-09-06 ASSESSMENT — PAIN SCALES - GENERAL
PAINLEVEL_OUTOF10: 4
PAINLEVEL_OUTOF10: 0
PAINLEVEL_OUTOF10: 0
PAINLEVEL_OUTOF10: 5
PAINLEVEL_OUTOF10: 6

## 2022-09-06 ASSESSMENT — PAIN DESCRIPTION - LOCATION
LOCATION: RIB CAGE
LOCATION: RIB CAGE
LOCATION: RIB CAGE;LEG

## 2022-09-06 ASSESSMENT — PAIN DESCRIPTION - DESCRIPTORS
DESCRIPTORS: ACHING
DESCRIPTORS: BURNING

## 2022-09-06 ASSESSMENT — PAIN DESCRIPTION - ORIENTATION: ORIENTATION: LEFT

## 2022-09-06 NOTE — GROUP NOTE
Group Therapy Note    Date: 9/6/2022    Group Start Time: 1100  Group End Time: 9550  Group Topic: Cognitive Skills    ETIENNE Lawrence        Group Therapy Note    Attendees: 10/22       Patient's Goal:  to improve communication skills     Notes:   pt was pleasant and participated well     Status After Intervention:  Improved    Participation Level:  Active Listener and Interactive    Participation Quality: Appropriate, Attentive, and Supportive      Speech:  normal      Thought Process/Content: Logical      Affective Functioning: flat      Mood:depressed       Level of consciousness:  Alert      Response to Learning: Able to verbalize current knowledge/experience and Progressing to goal      Endings: None Reported    Modes of Intervention: Education, Support, and Exploration      Discipline Responsible: Psychoeducational Specialist      Signature:  Vale Garcia

## 2022-09-06 NOTE — PLAN OF CARE
Problem: Psychosis  Goal: Will report no hallucinations or delusions  Description: INTERVENTIONS:  1. Administer medication as  ordered  2. Assist with reality testing to support increasing orientation  3. Assess if patient's hallucinations or delusions are encouraging self harm or harm to others and intervene as appropriate  9/5/2022 2152 by Ai Campoverde RN  Outcome: Progressing     Problem: Involuntary Admit  Goal: Will cooperate with staff recommendations and doctor's orders and will demonstrate appropriate behavior  Description: INTERVENTIONS:  1. Treat underlying conditions and offer medication as ordered  2. Educate regarding involuntary admission procedures and rules  3. Contain excessive/inappropriate behavior per unit and hospital policies  8/2/8292 2059 by Ai Campoverde RN  Outcome: Progressing  Patient is out in dayroom social with peers, patient attended night groups and took all ordered medications. Patient denies any suicidal ideations, and reports no hallucinations.
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
## 2022-09-06 NOTE — PLAN OF CARE
Problem: Psychosis  Goal: Will report no hallucinations or delusions  Description: INTERVENTIONS:  1. Administer medication as  ordered  2. Assist with reality testing to support increasing orientation  3. Assess if patient's hallucinations or delusions are encouraging self harm or harm to others and intervene as appropriate  9/6/2022 0830 by Lillian Loyd RN  Outcome: Progressing    1:1 with pt x ten minutes. Pt encouraged to attend unit programming and interact with peers and staff. Pt also encouraged to tend to hygiene and ADLs. Pt encouraged to discuss feelings with staff and feedback and reassurance provided. Pt denies thoughts of self harm and is agreeable to seeking out should thoughts of self harm arise. Safe environment maintained. Q15 minute checks for safety cont per unit policy. Will cont to monitor for safety and provides support and reassurance as needed. Pt attends groups et is social with select staff. Lacks insight et does admit to feeling anxious at times. Was compliant with medications.

## 2022-09-06 NOTE — GROUP NOTE
Group Therapy Note    Date: 9/5/2022    Group Start Time: 2030  Group End Time: 2050  Group Topic: Wrap-Up    [facility] ELYSSA Daley RN        Group Therapy Note    Attendees: 12/19         Status After Intervention:  Improved    Participation Level:  Active Listener    Participation Quality: Appropriate      Speech:  normal      Thought Process/Content: Logical      Affective Functioning: Blunted      Mood: depressed      Level of consciousness:  Alert      Response to Learning: Able to verbalize current knowledge/experience      Endings: None Reported    Modes of Intervention: Education      Discipline Responsible: Behavorial Health Tech      Signature:  Cem Daley RN

## 2022-09-06 NOTE — GROUP NOTE
Group Therapy Note    Date: 9/6/2022    Group Start Time: 1330  Group End Time: 3820  Group Topic: Recreation Group     [facility]    ETIENNE Ugalde        Group Therapy Note    Attendees: 10/22       Patient's Goal:  to improve coping skills / improve socialization    Notes:   pt was pleasant and participated well     Status After Intervention:  Improved    Participation Level:  Active Listener and Interactive    Participation Quality: Appropriate, Attentive, and Supportive      Speech:  normal      Thought Process/Content: Logical      Affective Functioning: Congruent      Mood: euthymic      Level of consciousness:  Alert      Response to Learning: Able to verbalize current knowledge/experience and Progressing to goal      Endings: None Reported    Modes of Intervention: Education, Support, and Exploration      Discipline Responsible: Psychoeducational Specialist      Signature:  Rosas George

## 2022-09-07 PROCEDURE — 1240000000 HC EMOTIONAL WELLNESS R&B

## 2022-09-07 PROCEDURE — 6370000000 HC RX 637 (ALT 250 FOR IP): Performed by: NURSE PRACTITIONER

## 2022-09-07 PROCEDURE — 6370000000 HC RX 637 (ALT 250 FOR IP): Performed by: PSYCHIATRY & NEUROLOGY

## 2022-09-07 PROCEDURE — 90833 PSYTX W PT W E/M 30 MIN: CPT | Performed by: PSYCHIATRY & NEUROLOGY

## 2022-09-07 PROCEDURE — 99232 SBSQ HOSP IP/OBS MODERATE 35: CPT | Performed by: PSYCHIATRY & NEUROLOGY

## 2022-09-07 PROCEDURE — APPSS30 APP SPLIT SHARED TIME 16-30 MINUTES

## 2022-09-07 RX ORDER — BISACODYL 10 MG
10 SUPPOSITORY, RECTAL RECTAL DAILY PRN
Status: DISCONTINUED | OUTPATIENT
Start: 2022-09-07 | End: 2022-09-11 | Stop reason: HOSPADM

## 2022-09-07 RX ADMIN — POLYETHYLENE GLYCOL 3350 17 G: 17 POWDER, FOR SOLUTION ORAL at 09:11

## 2022-09-07 RX ADMIN — BISACODYL 5 MG: 5 TABLET, COATED ORAL at 22:15

## 2022-09-07 RX ADMIN — IBUPROFEN 400 MG: 400 TABLET ORAL at 21:29

## 2022-09-07 RX ADMIN — RISPERIDONE 1.5 MG: 1 TABLET ORAL at 09:09

## 2022-09-07 RX ADMIN — IBUPROFEN 400 MG: 400 TABLET ORAL at 12:59

## 2022-09-07 RX ADMIN — IBUPROFEN 400 MG: 400 TABLET ORAL at 06:26

## 2022-09-07 RX ADMIN — RISPERIDONE 1.5 MG: 1 TABLET ORAL at 21:29

## 2022-09-07 ASSESSMENT — PAIN DESCRIPTION - ORIENTATION: ORIENTATION: LEFT

## 2022-09-07 ASSESSMENT — PAIN DESCRIPTION - LOCATION
LOCATION: RIB CAGE
LOCATION: GENERALIZED
LOCATION: RIB CAGE
LOCATION: RIB CAGE

## 2022-09-07 ASSESSMENT — PAIN DESCRIPTION - DESCRIPTORS: DESCRIPTORS: ACHING

## 2022-09-07 ASSESSMENT — PAIN SCALES - GENERAL
PAINLEVEL_OUTOF10: 3
PAINLEVEL_OUTOF10: 3
PAINLEVEL_OUTOF10: 4

## 2022-09-07 NOTE — PROGRESS NOTES
09/07/22 1359   Encounter Summary   Encounter Overview/Reason  Spiritual/Emotional Needs   Service Provided For: Patient   Referral/Consult From: Santiago   Last Encounter  09/07/22   Complexity of Encounter Moderate   Begin Time 0130   End Time  0200   Total Time Calculated 30 min   Spiritual/Emotional needs   Type Spiritual Support   Behavioral Health    Type  [facility]   Assessment/Intervention/Outcome   Assessment Calm   Intervention Active listening;Prayer (assurance of)/[place]   Outcome Receptive; Expressed Gratitude

## 2022-09-07 NOTE — GROUP NOTE
Group Therapy Note    Date: 9/7/2022    Group Start Time: 0900  Group End Time: 0920  Group Topic: Community Meeting    [address] [place] [address] KALYANIDEEPTI ELYSSA Talavera        Group Therapy Note    Attendees: 11/20       Patient's Goal:  Talk with internal medical doctor about rib pain    Notes:  controlled    Status After Intervention:  Unchanged    Participation Level: Active Listener and Interactive    Participation Quality: Appropriate and Attentive      Speech:  normal      Thought Process/Content: Logical      Affective Functioning: Congruent      Mood: anxious      Level of consciousness:  Alert and Attentive      Response to Learning: Able to verbalize current knowledge/experience and Progressing to goal      Endings: None Reported    Modes of Intervention: Education, Support, and Socialization      Discipline Responsible: Behavorial Health Tech      Signature:   Yesika Talavera

## 2022-09-07 NOTE — GROUP NOTE
Group Therapy Note    Date: 9/7/2022    Group Start Time: 1000  Group End Time: 1030  Group Topic: Psychotherapy    [facility]    Sid Velazquez        Group Therapy Note    Attendees: 9/20       Patient's Goal: PT will demonstrate increased interpersonal interaction and a clear understanding on multiple types of coping skills relating to the here-and-now therapeutic practice. Notes:Patient is making progress, AEB participating in group discussion, actively listening, and supporting other group members. PT participates in group and encourages others to participate     Status After Intervention:  Improved    Participation Level: Active Listener and Interactive    Participation Quality: Appropriate, Attentive, and Sharing      Speech:  normal      Thought Process/Content: Logical      Affective Functioning: Flat      Mood: depressed      Level of consciousness:  Alert, Oriented x4, and Attentive      Response to Learning: Able to verbalize/acknowledge new learning and Progressing to goal      Endings: None Reported    Modes of Intervention: Support, Socialization, Exploration, Clarifying, and Problem-solving      Discipline Responsible: /Counselor      Signature:   Sid Velazquez

## 2022-09-07 NOTE — PROGRESS NOTES
Pharmacy Med Education Group Note    Date: 9/7/22  Start Time: 1430  End Time: 1500    Number Participants in Group:  8    Goal:  Patient will demonstrate an understanding of the medications intended purpose and possible adverse effects  Topic: [place] for Pharmacy Med Ed Group    Discipline Responsible:     OT  AT  [facility].  RT     X Other       Participation Level:     None  Minimal      X Active Listener    X Interactive    Monopolizing         Participation Quality:    X Appropriate  Inappropriate     X       Attentive        Intrusive          Sharing        Resistant          Supportive        Lethargic       Affective:     X Congruent  Incongruent  Blunted  Flat    Constricted  Anxious  Elated  Angry    Labile  Depressed  Other         Cognitive:    X Alert  Oriented PPTP     Concentration   X G  F  P   Attention Span   X G  F  P   Short-Term Memory   X G  F  P   Long-Term Memory  G  F  P   ProblemSolving/  Decision Making  G  F  P   Ability to Process  Information   X G  F  P      Contributing Factors             Delusional             Hallucinating             Flight of Ideas             Other:       Modes of Intervention:    X Education   X Support  Exploration    Clarifying  Problem Solving  Confrontation    Socialization  Limit Setting  Reality Testing    Activity  Movement  Media    Other:            Response to Learning:    X Able to verbalize current knowledge/experience    Able to verbalize/acknowledge new learning    Able to retain information    Capable of insight    Able to change behavior    Progressing to goal    Other:        Comments:       [place] PharmD, BCPS, BCPP  9/7/2022 3:26 PM  [phone]
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
## 2022-09-07 NOTE — PLAN OF CARE
Problem: Psychosis  Goal: Will report no hallucinations or delusions  Description: INTERVENTIONS:  1. Administer medication as  ordered  2. Assist with reality testing to support increasing orientation  3. Assess if patient's hallucinations or delusions are encouraging self harm or harm to others and intervene as appropriate  Outcome: Progressing     Problem: Involuntary Admit  Goal: Will cooperate with staff recommendations and doctor's orders and will demonstrate appropriate behavior  Description: INTERVENTIONS:  1. Treat underlying conditions and offer medication as ordered  2. Educate regarding involuntary admission procedures and rules  3. Contain excessive/inappropriate behavior per unit and hospital policies  Outcome: Progressing     Problem: Nutrition Deficit:  Goal: Optimize nutritional status  Outcome: Progressing     Problem: Pain  Goal: Verbalizes/displays adequate comfort level or baseline comfort level  Outcome: Progressing   Patient denies auditory or visual hallucinations during this shift, and does not seem to be responding to internal stimuli. Patient denies suicidal ideations. She spoke with writer regarding her rib pain that she has been using motrin for. Writer discussed the plan being for Internal Med to see her and assess the pain. She reports the motrin easing some of the pain. Patient is out and social in the milieu with her peers during this shift. Patient reports adequate sleep and appetite. Q15 minute checks maintained for safety.

## 2022-09-08 PROCEDURE — 99232 SBSQ HOSP IP/OBS MODERATE 35: CPT | Performed by: PSYCHIATRY & NEUROLOGY

## 2022-09-08 PROCEDURE — 1240000000 HC EMOTIONAL WELLNESS R&B

## 2022-09-08 PROCEDURE — APPSS30 APP SPLIT SHARED TIME 16-30 MINUTES: Performed by: NURSE PRACTITIONER

## 2022-09-08 PROCEDURE — 6370000000 HC RX 637 (ALT 250 FOR IP): Performed by: PSYCHIATRY & NEUROLOGY

## 2022-09-08 RX ORDER — BENZTROPINE MESYLATE 1 MG/1
1 TABLET ORAL 2 TIMES DAILY
Status: DISCONTINUED | OUTPATIENT
Start: 2022-09-08 | End: 2022-09-11 | Stop reason: HOSPADM

## 2022-09-08 RX ORDER — PALIPERIDONE 3 MG/1
3 TABLET, EXTENDED RELEASE ORAL DAILY
Status: DISCONTINUED | OUTPATIENT
Start: 2022-09-08 | End: 2022-09-11 | Stop reason: HOSPADM

## 2022-09-08 RX ADMIN — IBUPROFEN 400 MG: 400 TABLET ORAL at 06:46

## 2022-09-08 RX ADMIN — BENZTROPINE MESYLATE 1 MG: 1 TABLET ORAL at 21:08

## 2022-09-08 RX ADMIN — BENZTROPINE MESYLATE 1 MG: 1 TABLET ORAL at 12:37

## 2022-09-08 RX ADMIN — RISPERIDONE 1.5 MG: 1 TABLET ORAL at 08:46

## 2022-09-08 RX ADMIN — IBUPROFEN 400 MG: 400 TABLET ORAL at 12:30

## 2022-09-08 RX ADMIN — PALIPERIDONE 3 MG: 3 TABLET, EXTENDED RELEASE ORAL at 12:37

## 2022-09-08 RX ADMIN — IBUPROFEN 400 MG: 400 TABLET ORAL at 18:42

## 2022-09-08 ASSESSMENT — PAIN SCALES - GENERAL
PAINLEVEL_OUTOF10: 5
PAINLEVEL_OUTOF10: 0
PAINLEVEL_OUTOF10: 8

## 2022-09-08 ASSESSMENT — PAIN DESCRIPTION - LOCATION
LOCATION: HEAD
LOCATION: JAW;RIB CAGE

## 2022-09-08 ASSESSMENT — PAIN DESCRIPTION - DESCRIPTORS: DESCRIPTORS: ACHING

## 2022-09-08 ASSESSMENT — PAIN DESCRIPTION - ORIENTATION: ORIENTATION: LEFT

## 2022-09-08 NOTE — PROGRESS NOTES
Daily Progress Note  9/8/2022    Patient Name: Marva Ireland    CHIEF COMPLAINT: Acute psychosis         SUBJECTIVE:    Patient was seen for follow-up assessment today. She has been medication adherent and behaviorally in control. She has not required any emergency medications in the last 24 hours. Patient presented as somewhat irritable today. She is reporting a history of chronic back pain and describes feeling \"constricted\" and states it feels like her spine is tight up through her shoulders and neck. Patient feels she is becoming restless about her a prolonged dmission and becomes tearful. She is looking forward to moving in with her friend in [place]. She feels that being here is making her mood worse. She is denying auditory and visual hallucinations. She makes no delusional or paranoid statements. She denies suicidal and homicidal ideation. She remains hopeful for discharge soon. Appetite:  [x] Adequate/Unchanged  [] Increased  [] Decreased      Sleep:       [x] Adequate/Unchanged  [] Fair  [] Poor      Group Attendance on Unit:   [] Yes   [x] Selectively    [] No    Medication Side Effects: Possible dystonia         Mental Status Exam  Level of consciousness: Awake and alert  Appearance: Appropriate attire for setting, seated in chair, with good grooming and hygiene. Behavior/Motor: Approachable, tearful  Attitude toward examiner: Cooperative, attentive, fair eye contact, somewhat irritable  Speech: normal rate and volume, depressed tone  Mood: Irritable  Affect: Congruent  Thought processes: Linear and coherent  Thought content: Denies homicidal ideation. Suicidal Ideation: Denies suicidal ideations. Delusions: Paranoid; reports improvement  Perceptual Disturbance: Patient does not appear to be responding to internal stimuli. Denies auditory hallucinations. Denies visual hallucinations. Cognition: Oriented to self, location, time, and situation. Memory: Impaired.   Insight & Judgement: Poor    Data   height is 5' 4\" (1.626 m) and weight is 150 lb (68 kg). Her temporal temperature is 98.1 °F (36.7 °C). Her blood pressure is 150/97 (abnormal) and her pulse is 100. Her respiration is 14 and oxygen saturation is 98%. Labs:   Admission on 08/29/2022   Component Date Value Ref Range Status    Glucose 09/01/2022 114 (A) 70 - 99 mg/dL Final    BUN 09/01/2022 23 (A) 6 - 20 mg/dL Final    Creatinine 09/01/2022 0.77  0.50 - 0.90 mg/dL Final    Calcium 09/01/2022 9.9  8.6 - 10.4 mg/dL Final    Sodium 09/01/2022 139  135 - 144 mmol/L Final    Potassium 09/01/2022 3.8  3.7 - 5.3 mmol/L Final    Chloride 09/01/2022 102  98 - 107 mmol/L Final    CO2 09/01/2022 25  20 - 31 mmol/L Final    Anion Gap 09/01/2022 12  9 - 17 mmol/L Final    Alkaline Phosphatase 09/01/2022 84  35 - 104 U/L Final    ALT 09/01/2022 36 (A) 5 - 33 U/L Final    AST 09/01/2022 33 (A) <32 U/L Final    Total Bilirubin 09/01/2022 0.56  0.3 - 1.2 mg/dL Final    Total Protein 09/01/2022 7.4  6.4 - 8.3 g/dL Final    Albumin 09/01/2022 4.7  3.5 - 5.2 g/dL Final    GFR Non- 09/01/2022 >60  >60 mL/min Final    GFR  09/01/2022 >60  >60 mL/min Final    GFR Comment 09/01/2022        Final    Comment: Average GFR for 52-63 years old:   80 mL/min/1.73sq m  Chronic Kidney Disease:   <60 mL/min/1.73sq m  Kidney failure:   <15 mL/min/1.73sq m              eGFR calculated using average adult body mass. Additional eGFR calculator available at:        [employer]                 Reviewed patient's current plan of care and vital signs with nursing staff.     Labs reviewed: [x] Yes    Medications  Current Facility-Administered Medications: paliperidone (INVEGA) extended release tablet 3 mg, 3 mg, Oral, Daily  benztropine (COGENTIN) tablet 1 mg, 1 mg, Oral, BID  bisacodyl (DULCOLAX) suppository 10 mg, 10 mg, Rectal, Daily PRN  bisacodyl (DULCOLAX) EC tablet 5 mg, 5 mg, Oral, Daily PRN  acetaminophen (TYLENOL) tablet 650 mg, 650 mg, Oral, Q6H PRN  ibuprofen (ADVIL;MOTRIN) tablet 400 mg, 400 mg, Oral, Q6H PRN  hydrOXYzine HCl (ATARAX) tablet 50 mg, 50 mg, Oral, TID PRN  traZODone (DESYREL) tablet 50 mg, 50 mg, Oral, Nightly PRN  polyethylene glycol (GLYCOLAX) packet 17 g, 17 g, Oral, Daily PRN  aluminum & magnesium hydroxide-simethicone (MAALOX) 200-200-20 MG/5ML suspension 30 mL, 30 mL, Oral, Q6H PRN  nicotine polacrilex (NICORETTE) gum 2 mg, 2 mg, Oral, Q2H PRN  haloperidol lactate (HALDOL) injection 5 mg, 5 mg, IntraMUSCular, Q6H PRN **AND** diphenhydrAMINE (BENADRYL) injection 50 mg, 50 mg, IntraMUSCular, Q6H PRN  haloperidol (HALDOL) tablet 5 mg, 5 mg, Oral, Q6H PRN    ASSESSMENT  Acute psychosis ([facility])         HANDOFF  Patient symptoms: Improving  Medications as determined by attending physician  Encourage participation in groups and milieu. Probable discharge is to be determined by MD    Electronically signed by REJI Gorman CNP on 9/8/2022 at 4:30 PM    **This report has been created using voice recognition software. It may contain minor errors which are inherent in voice recognition technology. **        I independently saw and evaluated the patient. I reviewed the  documentation above. Any additional comments or changes to the   documentation are stated below otherwise agree with assessment. The patient is complaining of stiffness and dystonia-like symptoms in her back. She has a history of muscle spasms. The patient wants muscle relaxants which she had tried before. We discussed that we can try Cogentin which may help. The patient's risperidone will be switched to paliperidone 3 mg daily. This will probably need to be titrated up to 6 mg tomorrow. We will also start Cogentin 1 Mg twice daily. PLAN  Medications as noted above  Attempt to develop insight  Psycho-education conducted.   Estimated Length of Stay is 3-6 days  Supportive Therapy conducted.   Follow-up daily while on inpatient unit    Electronically signed by Elroy Muse MD on 9/8/22 at 6:00 PM EDT

## 2022-09-08 NOTE — PROGRESS NOTES
[facility]  Bishop Diaz MD; Kelsey Tripathi MD; Devika Proctor MD; MD Brook Sanchez MD; Makenna Wei MD    [facility]   [address] / HISTORY AND PHYSICAL EXAMINATION            Date:   9/8/2022  Patient name:  Niels Mckeon  Date of admission:  8/29/2022  7:05 PM  MRN:   [id number]  Account:  [de-identified]  YOB: 1965  PCP:    No primary care provider on file. Room:   [address] [place] [address]  Code Status:    Full Code    Physician Requesting Consult: Narendra Calles MD    Reason for Consult: Medical management    Chief Complaint:     No chief complaint on file. Suicidal ideations    History Obtained From:     patient    History of Present Illness:       49-year-old female with underlying history of anxiety, depression, smoking, admitted to inpatient psych for acute psychosis    Past Medical History:     Past Medical History:   Diagnosis Date    EDS (Lulu-Danlos syndrome)         Past Surgical History:     Past Surgical History:   Procedure Laterality Date    SPINAL CORD UNTETHERING          Medications Prior to Admission:     Prior to Admission medications    Not on File        Allergies:     Lisinopril    Social History:     Tobacco:    reports that she has been smoking cigarettes. She has been smoking an average of .5 packs per day. She has never used smokeless tobacco.  Alcohol:      reports that she does not currently use alcohol. Drug Use:  reports current drug use. Drug: Marijuana [facility]). Family History:     No family history on file. Review of Systems:     Positive and Negative as described in HPI. CONSTITUTIONAL:  negative for fevers, chills, sweats, fatigue, weight loss  HEENT:  negative for vision, hearing changes, runny nose, throat pain  RESPIRATORY:  negative for shortness of breath, cough, congestion, wheezing.   CARDIOVASCULAR:  negative for chest pain, palpitations. GASTROINTESTINAL:  negative for nausea, vomiting, diarrhea, constipation, change in bowel habits, abdominal pain   GENITOURINARY:  negative for difficulty of urination, burning with urination, frequency   INTEGUMENT:  negative for rash, skin lesions, easy bruising   HEMATOLOGIC/LYMPHATIC:  negative for swelling/edema   ALLERGIC/IMMUNOLOGIC:  negative for urticaria , itching  ENDOCRINE:  negative increase in drinking, increase in urination, hot or cold intolerance  MUSCULOSKELETAL:  negative joint pains, muscle aches, swelling of joints  NEUROLOGICAL:  negative for headaches, dizziness, lightheadedness, numbness, pain, tingling extremities    Physical Exam:     BP (!) 150/97   Pulse 100   Temp 98.1 °F (36.7 °C) (Temporal)   Resp 14   Ht 5' 4\" (1.626 m)   Wt 150 lb (68 kg)   SpO2 98%   BMI 25.75 kg/m²   Temp (24hrs), Av.2 °F (36.8 °C), Min:98.1 °F (36.7 °C), Max:98.3 °F (36.8 °C)    No results for input(s): POCGLU in the last 72 hours. No intake or output data in the 24 hours ending 22 1638    General Appearance:  alert, well appearing, and in no acute distress  Mental status: oriented to person, place, and time with normal affect  Head:  normocephalic, atraumatic. Eye: no icterus, redness, pupils equal and reactive, extraocular eye movements intact, conjunctiva clear  Ear: normal external ear, no discharge, hearing intact  Nose:  no drainage noted  Mouth: mucous membranes moist  Neck: supple, no carotid bruits, thyroid not palpable  Lungs: Bilateral equal air entry, clear to ausculation, no wheezing, rales or rhonchi, normal effort  Cardiovascular: normal rate, regular rhythm, no murmur, gallop, rub.   Abdomen: Soft, nontender, nondistended, normal bowel sounds, no hepatomegaly or splenomegaly  Neurologic: There are no new focal motor or sensory deficits, normal muscle tone and bulk, no abnormal sensation, normal speech, cranial nerves II through XII grossly intact  Skin: No

## 2022-09-08 NOTE — PLAN OF CARE
Problem: Psychosis  Goal: Will report no hallucinations or delusions  Description: INTERVENTIONS:  1. Administer medication as  ordered  2. Assist with reality testing to support increasing orientation  3. Assess if patient's hallucinations or delusions are encouraging self harm or harm to others and intervene as appropriate  9/8/2022 0233 by Brittany Israel RN  Outcome: Progressing     Problem: Nutrition Deficit:  Goal: Optimize nutritional status  9/8/2022 0233 by Brittany Israel RN  Outcome: Progressing     Problem: Pain  Goal: Verbalizes/displays adequate comfort level or baseline comfort level  9/8/2022 0233 by Brittany Israel RN  Outcome: Progressing   Patient denies auditory or visual hallucinations during this shift, and does not seem to be responding to internal stimuli. Patient denies suicidal ideations. Patient is isolative and aloof of peers during this shift. Patient reports adequate sleep and appetite.  Q15 minute checks maintained for safety

## 2022-09-08 NOTE — GROUP NOTE
Group Therapy Note    Date: 9/8/2022    Group Start Time: 1100  Group End Time: 4234  Group Topic: Cognitive Skills    [facility]    ETIENNE Ugalde    Cognitive Skills Group Note        Date: September 8, 2022 Start Time: 11am  End Time:  11:35pm      Number of Participants in Group & Unit Census:  8/20    Topic:cognitive skills     Goal of Group: to improve communication/ improve coping skills      Comments:     Patient did not participate in Cognitive Skills group, despite staff encouragement and explanation of benefits. Patient remain seclusive to self. Q15 minute safety checks maintained for patient safety and will continue to encourage patient to attend unit programming.             Signature:  Rosas George

## 2022-09-08 NOTE — GROUP NOTE
Group Therapy Note    Date: 9/8/2022    Group Start Time: 1330  Group End Time: 6576  Group Topic: Relaxation    SAY Childress, CTRS    Cognitive Skills Group Note        Date: September 8, 2022 Start Time: 1:30pm  End Time:  1:35pm      Number of Participants in Group & Unit Census:  5/19    Topic: relaxation group     Goal of Group:to improve relaxation using art      Comments:     Patient did not participate in Relaxation group, despite staff encouragement and explanation of benefits. Patient remain seclusive to self. Q15 minute safety checks maintained for patient safety and will continue to encourage patient to attend unit programming.             Signature:  Richard Flaherty

## 2022-09-08 NOTE — CARE COORDINATION
GABBI contacted the court asking for a secured [employer] Link to send Notice to the Court due to pt signing in for voluntary treatment. Notice to the [place] has been submitted.

## 2022-09-08 NOTE — GROUP NOTE
Group Therapy Note    Date: 9/7/2022    Group Start Time: 2030  Group End Time: 2055  Group Topic: Wrap-Up    [facility] ELYSSA Hudson        Group Therapy Note    Attendees: 11/17   Patient's Goal: Patient will verbalize todays goals as well as for post discharge. Patient will also offer supportive listening and interact with peers to clarify and understand clearly set goals. Notes: Patient is making progress AEB participating in group discussion, actively listening, and supporting other group members. Status After Intervention: Improved      Participation Level:  Active Listener and Interactive      Participation Quality: Appropriate, Attentive, Sharing, and Supportive      Speech: normal      Thought Process/Content: Logical, Linear      Affective Functioning: Congruent      Mood: anxious      Level of consciousness: Oriented x4      Response to Learning: Able to verbalize current knowledge/experience, Able to verbalize/acknowledge new learning,      Able to retain information, and Capable of insight      Endings: None Reported      Modes of Intervention: Education, Support, Socialization, and Problem-solving        Discipline Responsible: Behavorial Health Tech      Signature: Marcela Serrano

## 2022-09-08 NOTE — PLAN OF CARE
Problem: Psychosis  Goal: Will report no hallucinations or delusions  Description: INTERVENTIONS:  1. Administer medication as  ordered  2. Assist with reality testing to support increasing orientation  3. Assess if patient's hallucinations or delusions are encouraging self harm or harm to others and intervene as appropriate  9/8/2022 1117 by Raz Awad RN  Note: Ms. Shanita Johnson reports awaking with \"contractures\" all over her body, and requested to see internal med Dr. Johnson Benavides. Shanita Johnson is able to move all limbs full range of motion, full range of motion in head and neck. Ms. Shanita Johnson approaches staff with needs and requests. She remains in her room for most of the shift, and comes out for meals and needs.

## 2022-09-09 LAB
CALCIUM IONIZED: 1.23 MMOL/L (ref 1.1–1.33)
MYOGLOBIN: 31 NG/ML (ref 25–58)
TOTAL CK: 67 U/L (ref 26–192)

## 2022-09-09 PROCEDURE — APPSS30 APP SPLIT SHARED TIME 16-30 MINUTES: Performed by: NURSE PRACTITIONER

## 2022-09-09 PROCEDURE — 6370000000 HC RX 637 (ALT 250 FOR IP): Performed by: PSYCHIATRY & NEUROLOGY

## 2022-09-09 PROCEDURE — 82330 ASSAY OF CALCIUM: CPT

## 2022-09-09 PROCEDURE — 83874 ASSAY OF MYOGLOBIN: CPT

## 2022-09-09 PROCEDURE — 36415 COLL VENOUS BLD VENIPUNCTURE: CPT

## 2022-09-09 PROCEDURE — 1240000000 HC EMOTIONAL WELLNESS R&B

## 2022-09-09 PROCEDURE — 82550 ASSAY OF CK (CPK): CPT

## 2022-09-09 PROCEDURE — 99232 SBSQ HOSP IP/OBS MODERATE 35: CPT | Performed by: PSYCHIATRY & NEUROLOGY

## 2022-09-09 RX ADMIN — IBUPROFEN 400 MG: 400 TABLET ORAL at 21:55

## 2022-09-09 RX ADMIN — IBUPROFEN 400 MG: 400 TABLET ORAL at 06:10

## 2022-09-09 RX ADMIN — BENZTROPINE MESYLATE 1 MG: 1 TABLET ORAL at 09:15

## 2022-09-09 RX ADMIN — IBUPROFEN 400 MG: 400 TABLET ORAL at 12:54

## 2022-09-09 RX ADMIN — PALIPERIDONE 3 MG: 3 TABLET, EXTENDED RELEASE ORAL at 09:15

## 2022-09-09 RX ADMIN — BENZTROPINE MESYLATE 1 MG: 1 TABLET ORAL at 21:02

## 2022-09-09 ASSESSMENT — PAIN SCALES - GENERAL
PAINLEVEL_OUTOF10: 5
PAINLEVEL_OUTOF10: 3

## 2022-09-09 ASSESSMENT — PAIN DESCRIPTION - DESCRIPTORS: DESCRIPTORS: ACHING

## 2022-09-09 NOTE — PLAN OF CARE
Problem: Psychosis  Goal: Will report no hallucinations or delusions  Description: INTERVENTIONS:  1. Administer medication as  ordered  2. Assist with reality testing to support increasing orientation  3. Assess if patient's hallucinations or delusions are encouraging self harm or harm to others and intervene as appropriate  9/8/2022 2213 by Robert Villafuerte RN  Outcome: Progressing     Problem: Nutrition Deficit:  Goal: Optimize nutritional status  Outcome: Progressing     Problem: Pain  Goal: Verbalizes/displays adequate comfort level or baseline comfort level  Outcome: Progressing       Patient denies auditory or visual hallucinations during this shift, and does not seem to be responding to internal stimuli. Patient denies suicidal ideations. Patient is more social with peers this shift. She states she is feeling a lot better after she was a started on a medication jaw and shoulder tightness. Patient reports adequate sleep and appetite. Q15 minute checks maintained for safety.

## 2022-09-09 NOTE — PROGRESS NOTES
Daily Progress Note  9/9/2022    Patient Name: Marva Ireland    CHIEF COMPLAINT: Acute psychosis         SUBJECTIVE:      Patient seen face-to-face for follow-up assessment. Yesterday, she had been endorsing dystonia and feelings that she was \"all locked up\". Her risperidone was discontinued and she was started on paliperidone and Cogentin. Patient reports all EPS symptoms have significantly decreased. She occasionally feels some muscle rigidity, but it is not present currently and she denies feeling uncomfortable or in distress. Patient reports feeling that her mood is improving. We discussed events that led to admission. Patient showing more insight and is able to engage in open-ended conversation. Some thought blocking observed at times, but thought processes overall more linear. Staff report that patient has been attending group programming and is active in her treatment. She denies any perceptual disturbances and feels that her paranoia is decreasing in intensity. Does not yet feel safe in the community. Patient verbalizes that her son is coming up from [place] to help her following discharge. She gave permission to speak with Devin Abad [phone]. Writer spoke with son who was at work at time of phone call. He requested to call writer back at a more convenient time for him. At time of this note, patient's son has not returned phone call. Will attempt to touch base with him tomorrow as patient's symptoms are improving and plan to discharge soon with stability. Appetite:  [x] Adequate/Unchanged  [] Increased  [] Decreased      Sleep:       [x] Adequate/Unchanged  [] Fair  [] Poor      Group Attendance on Unit:   [] Yes   [x] Selectively    [] No    Medication Side Effects: Possible dystonia         Mental Status Exam  Level of consciousness: Awake and alert  Appearance: Appropriate attire for setting, seated in chair, with good grooming and hygiene.    Behavior/Motor: Approachable, tearful  Attitude toward examiner: Cooperative, attentive, fair eye contact  Speech: Delayed responses, normal volume, tone and good articulation  Mood: Patient reports \"feeling better\"  Affect: Blunted  Thought processes: Linear and coherent  Thought content: Denies homicidal ideation. Suicidal Ideation: Denies suicidal ideations. Delusions: Paranoid; reports improvement  Perceptual Disturbance: Patient does not appear to be responding to internal stimuli. Denies auditory hallucinations. Denies visual hallucinations. Cognition: Oriented to self, location, time, and situation. Memory: Impaired. Insight & Judgement: Poor    Data   height is 5' 4\" (1.626 m) and weight is 150 lb (68 kg). Her temporal temperature is 98 °F (36.7 °C). Her blood pressure is 126/86 and her pulse is 82. Her respiration is 14 and oxygen saturation is 98%.    Labs:   Admission on 08/29/2022   Component Date Value Ref Range Status    Glucose 09/01/2022 114 (A) 70 - 99 mg/dL Final    BUN 09/01/2022 23 (A) 6 - 20 mg/dL Final    Creatinine 09/01/2022 0.77  0.50 - 0.90 mg/dL Final    Calcium 09/01/2022 9.9  8.6 - 10.4 mg/dL Final    Sodium 09/01/2022 139  135 - 144 mmol/L Final    Potassium 09/01/2022 3.8  3.7 - 5.3 mmol/L Final    Chloride 09/01/2022 102  98 - 107 mmol/L Final    CO2 09/01/2022 25  20 - 31 mmol/L Final    Anion Gap 09/01/2022 12  9 - 17 mmol/L Final    Alkaline Phosphatase 09/01/2022 84  35 - 104 U/L Final    ALT 09/01/2022 36 (A) 5 - 33 U/L Final    AST 09/01/2022 33 (A) <32 U/L Final    Total Bilirubin 09/01/2022 0.56  0.3 - 1.2 mg/dL Final    Total Protein 09/01/2022 7.4  6.4 - 8.3 g/dL Final    Albumin 09/01/2022 4.7  3.5 - 5.2 g/dL Final    GFR Non- 09/01/2022 >60  >60 mL/min Final    GFR  09/01/2022 >60  >60 mL/min Final    GFR Comment 09/01/2022        Final    Comment: Average GFR for 52-63 years old:   80 mL/min/1.73sq m  Chronic Kidney Disease:   <60 mL/min/1.73sq m  Kidney failure: <15 mL/min/1.73sq m              eGFR calculated using average adult body mass. Additional eGFR calculator available at:        [employer]            Total CK 09/09/2022 67  26 - 192 U/L Final    Calcium, Ionized 09/09/2022 1.23  1.10 - 1.33 mmol/L Final    Myoglobin 09/09/2022 31  25 - 58 ng/mL Final         Reviewed patient's current plan of care and vital signs with nursing staff. Labs reviewed: [x] Yes    Medications  Current Facility-Administered Medications: paliperidone (INVEGA) extended release tablet 3 mg, 3 mg, Oral, Daily  benztropine (COGENTIN) tablet 1 mg, 1 mg, Oral, BID  bisacodyl (DULCOLAX) suppository 10 mg, 10 mg, Rectal, Daily PRN  bisacodyl (DULCOLAX) EC tablet 5 mg, 5 mg, Oral, Daily PRN  acetaminophen (TYLENOL) tablet 650 mg, 650 mg, Oral, Q6H PRN  ibuprofen (ADVIL;MOTRIN) tablet 400 mg, 400 mg, Oral, Q6H PRN  hydrOXYzine HCl (ATARAX) tablet 50 mg, 50 mg, Oral, TID PRN  traZODone (DESYREL) tablet 50 mg, 50 mg, Oral, Nightly PRN  polyethylene glycol (GLYCOLAX) packet 17 g, 17 g, Oral, Daily PRN  aluminum & magnesium hydroxide-simethicone (MAALOX) 200-200-20 MG/5ML suspension 30 mL, 30 mL, Oral, Q6H PRN  nicotine polacrilex (NICORETTE) gum 2 mg, 2 mg, Oral, Q2H PRN  haloperidol lactate (HALDOL) injection 5 mg, 5 mg, IntraMUSCular, Q6H PRN **AND** diphenhydrAMINE (BENADRYL) injection 50 mg, 50 mg, IntraMUSCular, Q6H PRN  haloperidol (HALDOL) tablet 5 mg, 5 mg, Oral, Q6H PRN    ASSESSMENT  Acute psychosis (HCC)         HANDOFF  Patient symptoms: Improving  Medications as determined by attending physician  Encourage participation in groups and milieu. Probable discharge is to be determined by MD    Electronically signed by REJI Buckner CNP on 9/9/2022 at 5:17 PM    **This report has been created using voice recognition software.  It may                                          Psychiatry Attending Attestation     I independently saw and evaluated the patient. I reviewed the Advance Practice Provider's documentation above. Any additional comments or changes to the Advance Practice Provider's documentation are stated below otherwise agree with assessment. Patient is dealing with severe rigidity and notes that it does improve with using Cogentin. Thought process more organized today. Ms. Audrey Garcia is trying to reach out to her son to come up with discharge planning soon. PLAN  Patient s symptoms are improving  Will continue same medication today and observe  Attempt to develop insight  Psycho-education conducted. Supportive Therapy conducted.   Probable discharge is 1-2 days  Follow-up TBD    Electronically signed by Walker Zaldivar MD on 9/9/22 at 10:20 PM EDT

## 2022-09-09 NOTE — GROUP NOTE
Group Therapy Note    Date: 9/9/2022    Group Start Time: 1330  Group End Time: 5512  Group Topic: Richardson Penstephanie evans    [facility] ETIEN[facility] Saleem           Patient's Goal:  to improve coping skills /improve positive leisure choices     Notes:   pt was pleasant and participated well     Status After Intervention:  Improved    Participation Level:  Active Listener and Interactive    Participation Quality: Appropriate and Supportive      Speech:  normal      Thought Process/Content: Logical      Affective Functioning: Congruent      Mood: euthymic      Level of consciousness:  Alert      Response to Learning: Able to verbalize current knowledge/experience and Progressing to goal      Endings: None Reported    Modes of Intervention: Education, Support, and Problem-solving      Discipline Responsible: Psychoeducational Specialist      Signature:  Jean Rojo

## 2022-09-09 NOTE — GROUP NOTE
Group Therapy Note    Date: 9/9/2022    Group Start Time: 1323  Group End Time: 1510  Group Topic: Focus Group    [facility]    Eloy Deleon LPN           Patient's Goal:  Affirmations        Status After Intervention:  Improved    Participation Level:  Active Listener and Interactive    Participation Quality: Appropriate      Speech:  normal      Thought Process/Content: Logical      Affective Functioning: Congruent      Mood: euthymic      Level of consciousness:  Alert and Oriented x4      Response to Learning: Progressing to goal      Endings: None Reported    Modes of Intervention: Support      Discipline Responsible: Licensed Practical Nurse      Signature:  Eloy Deleon LPN

## 2022-09-09 NOTE — GROUP NOTE
Group Therapy Note    Date: 9/9/2022    Group Start Time: 1100  Group End Time: 1130  Group Topic: Cognitive Skills    [facility] ETIENNE Tam           Patient's Goal:  to improve decision making skills and communication skills     Notes:   pt was pleasant and participated well     Status After Intervention:  Improved    Participation Level:  Active Listener and Interactive    Participation Quality: Appropriate and Supportive      Speech:  normal      Thought Process/Content: Logical      Affective Functioning: Congruent      Mood: euthymic      Level of consciousness:  Alert      Response to Learning: Able to verbalize current knowledge/experience and Progressing to goal      Endings: None Reported    Modes of Intervention: Education, Support, and Problem-solving      Discipline Responsible: Psychoeducational Specialist      Signature:  Debbie Farley

## 2022-09-09 NOTE — GROUP NOTE
Group Therapy Note    Date: 9/8/2022    Group Start Time: 2000  Group End Time: 2030  Group Topic: Wrap-Up    [facility]    Ginger Conte RN      Group Therapy Note    Attendees: 12/19     Patient's Goal:    1. To be able to reflect on daily unit activities/experiences. 2.  To review accomplished daily goals and be encouraged to set new goals for the next day. 3.  To improve interpersonal interaction through socialization. Notes:  Pt attended and actively participated in Wrap-Up/Goal Review group this evening. Status After Intervention:  Improved     Participation Level:  Active Listener and Interactive     Participation Quality: Appropriate, Attentive and Sharing     Speech:  normal     Thought Process/Content: Logical     Affective Functioning: Congruent     Mood: euthymic     Level of consciousness:  Alert, Oriented x4 and Attentive     Response to Learning: Able to verbalize current knowledge/experience, Able to verbalize/acknowledge new learning, Progressing to goal     Endings: None Reported     Modes of Intervention: Education, Support and Socialization     Discipline Responsible: Registered Nurse        Signature:  Ginger Conte RN

## 2022-09-10 PROCEDURE — 6370000000 HC RX 637 (ALT 250 FOR IP): Performed by: PSYCHIATRY & NEUROLOGY

## 2022-09-10 PROCEDURE — 6370000000 HC RX 637 (ALT 250 FOR IP): Performed by: NURSE PRACTITIONER

## 2022-09-10 PROCEDURE — 99232 SBSQ HOSP IP/OBS MODERATE 35: CPT | Performed by: PSYCHIATRY & NEUROLOGY

## 2022-09-10 PROCEDURE — 1240000000 HC EMOTIONAL WELLNESS R&B

## 2022-09-10 PROCEDURE — APPSS30 APP SPLIT SHARED TIME 16-30 MINUTES: Performed by: NURSE PRACTITIONER

## 2022-09-10 RX ORDER — BACLOFEN 10 MG/1
10 TABLET ORAL 3 TIMES DAILY PRN
Status: DISCONTINUED | OUTPATIENT
Start: 2022-09-10 | End: 2022-09-11 | Stop reason: HOSPADM

## 2022-09-10 RX ADMIN — BENZTROPINE MESYLATE 1 MG: 1 TABLET ORAL at 08:32

## 2022-09-10 RX ADMIN — BISACODYL 5 MG: 5 TABLET, COATED ORAL at 10:28

## 2022-09-10 RX ADMIN — IBUPROFEN 400 MG: 400 TABLET ORAL at 20:33

## 2022-09-10 RX ADMIN — HYDROXYZINE HYDROCHLORIDE 50 MG: 50 TABLET, FILM COATED ORAL at 10:30

## 2022-09-10 RX ADMIN — BACLOFEN 10 MG: 10 TABLET ORAL at 18:19

## 2022-09-10 RX ADMIN — IBUPROFEN 400 MG: 400 TABLET ORAL at 08:33

## 2022-09-10 RX ADMIN — BENZTROPINE MESYLATE 1 MG: 1 TABLET ORAL at 20:33

## 2022-09-10 RX ADMIN — PALIPERIDONE 3 MG: 3 TABLET, EXTENDED RELEASE ORAL at 08:32

## 2022-09-10 RX ADMIN — HYDROXYZINE HYDROCHLORIDE 50 MG: 50 TABLET, FILM COATED ORAL at 20:33

## 2022-09-10 ASSESSMENT — PAIN DESCRIPTION - LOCATION
LOCATION: BACK;SHOULDER
LOCATION: BACK;NECK

## 2022-09-10 ASSESSMENT — PAIN DESCRIPTION - DESCRIPTORS: DESCRIPTORS: TIGHTNESS

## 2022-09-10 ASSESSMENT — PAIN SCALES - GENERAL
PAINLEVEL_OUTOF10: 6
PAINLEVEL_OUTOF10: 4
PAINLEVEL_OUTOF10: 6

## 2022-09-10 ASSESSMENT — PAIN - FUNCTIONAL ASSESSMENT
PAIN_FUNCTIONAL_ASSESSMENT: ACTIVITIES ARE NOT PREVENTED
PAIN_FUNCTIONAL_ASSESSMENT: ACTIVITIES ARE NOT PREVENTED

## 2022-09-10 ASSESSMENT — PAIN SCALES - WONG BAKER: WONGBAKER_NUMERICALRESPONSE: 2

## 2022-09-10 ASSESSMENT — PAIN DESCRIPTION - ORIENTATION: ORIENTATION: LOWER
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
## 2022-09-10 NOTE — PLAN OF CARE
Problem: Pain  Goal: Verbalizes/displays adequate comfort level or baseline comfort level  9/10/2022 1129 by Jose Carreno RN  Outcome: Progressing     Problem: Psychosis  Goal: Will report no hallucinations or delusions  Description: INTERVENTIONS:  1. Administer medication as  ordered  2. Assist with reality testing to support increasing orientation  3. Assess if patient's hallucinations or delusions are encouraging self harm or harm to others and intervene as appropriate  9/10/2022 1129 by Jose Carreno RN  Outcome: Progressing     Patient states that she is in pain because of contractures that she believes are caused by her medication. Patient states that her back, neck, and shoulders are all contracted and that she is also having constipation because of this. However, no contractures can be seen and patient reports having a bowel movement yesterday. Patient given cogentin and informed that Atarax might help with medication side effects; patient reports being satisfied with this but wants more cogentin. She denies any hallucinations, delusions, anxiety, depression, suicidal or homicidal thoughts.

## 2022-09-10 NOTE — PLAN OF CARE
Problem: Pain  Goal: Verbalizes/displays adequate comfort level or baseline comfort level  Outcome: Progressing  Flowsheets (Taken 9/10/2022 0045)  Verbalizes/displays adequate comfort level or baseline comfort level: Assess pain using appropriate pain scale  Note: Denies having pain at this time. Resting quietly in room. Problem: Psychosis  Goal: Will report no hallucinations or delusions  Description: INTERVENTIONS:  1. Administer medication as  ordered  2. Assist with reality testing to support increasing orientation  3. Assess if patient's hallucinations or delusions are encouraging self harm or harm to others and intervene as appropriate  Outcome: Progressing  Note: Denies hallucinations. Out and cooperative. Taking all prescribed medications for this shift. Denies suicidal thoughts. Safety checks maintained q15min and irregular rounding.

## 2022-09-10 NOTE — GROUP NOTE
Group Therapy Note    Date: 9/9/2022    Group Start Time: 2030  Group End Time: 2050  Group Topic: Relaxation    [facility] ELYSSA Paniagua RN        Group Therapy Note    Attendees: 9/17         Status After Intervention:  Improved    Participation Level:  Active Listener    Participation Quality: Appropriate      Speech:  normal      Thought Process/Content: Logical      Affective Functioning: Congruent      Level of consciousness:  Alert      Response to Learning: Able to verbalize current knowledge/experience      Endings: None Reported    Modes of Intervention: Education      Discipline Responsible: Behavorial Health Tech      Signature:  Nancy Paniagua RN

## 2022-09-10 NOTE — GROUP NOTE
Group Therapy Note    Date: 9/10/2022    Group Start Time: 1400  Group End Time: 5017  Group Topic: Recreational    [facility]    Ashley Solo, [place]        Group Therapy Note    Attendees: 10/18     Recreation Group Note     Date: 9/10/2022          Start Time: 14:00                      End Time: 14:45     Number of Participants in Group & Unit Census:    10 /18        Topic: Leisure skills, and communication skills. Goal of Group: To increase social interaction and to practice leisure skills, and communication skills. Comments: Patient did not participate in Cognitive Skills group, despite staff encouragement and explanation of benefits. Patient remains seclusive to self during group time. Q15 minute safety checks maintained for patient safety and will continue to encourage patient to attend unit programming.            Discipline Responsible: Psychoeducational Specialist        Signature:  Casie Ferrera

## 2022-09-11 VITALS
OXYGEN SATURATION: 98 % | DIASTOLIC BLOOD PRESSURE: 86 MMHG | WEIGHT: 150 LBS | RESPIRATION RATE: 14 BRPM | HEART RATE: 68 BPM | BODY MASS INDEX: 25.61 KG/M2 | SYSTOLIC BLOOD PRESSURE: 128 MMHG | TEMPERATURE: 97.7 F | HEIGHT: 64 IN

## 2022-09-11 PROCEDURE — 6370000000 HC RX 637 (ALT 250 FOR IP): Performed by: PSYCHIATRY & NEUROLOGY

## 2022-09-11 PROCEDURE — 99239 HOSP IP/OBS DSCHRG MGMT >30: CPT | Performed by: PSYCHIATRY & NEUROLOGY

## 2022-09-11 RX ORDER — PALIPERIDONE 3 MG/1
3 TABLET, EXTENDED RELEASE ORAL DAILY
Qty: 30 TABLET | Refills: 0 | Status: SHIPPED | OUTPATIENT
Start: 2022-09-12

## 2022-09-11 RX ORDER — HYDROXYZINE 50 MG/1
50 TABLET, FILM COATED ORAL 3 TIMES DAILY PRN
Qty: 45 TABLET | Refills: 0 | Status: SHIPPED | OUTPATIENT
Start: 2022-09-11 | End: 2022-09-26

## 2022-09-11 RX ORDER — BACLOFEN 10 MG/1
10 TABLET ORAL 3 TIMES DAILY PRN
Qty: 45 TABLET | Refills: 0 | Status: SHIPPED | OUTPATIENT
Start: 2022-09-11

## 2022-09-11 RX ORDER — BENZTROPINE MESYLATE 1 MG/1
1 TABLET ORAL 2 TIMES DAILY
Qty: 60 TABLET | Refills: 0 | Status: SHIPPED | OUTPATIENT
Start: 2022-09-11

## 2022-09-11 RX ORDER — TRAZODONE HYDROCHLORIDE 50 MG/1
50 TABLET ORAL NIGHTLY PRN
Qty: 30 TABLET | Refills: 0 | Status: SHIPPED | OUTPATIENT
Start: 2022-09-11

## 2022-09-11 RX ADMIN — BENZTROPINE MESYLATE 1 MG: 1 TABLET ORAL at 08:42

## 2022-09-11 RX ADMIN — BACLOFEN 10 MG: 10 TABLET ORAL at 08:42

## 2022-09-11 RX ADMIN — PALIPERIDONE 3 MG: 3 TABLET, EXTENDED RELEASE ORAL at 08:42

## 2022-09-11 RX ADMIN — BACLOFEN 10 MG: 10 TABLET ORAL at 00:44

## 2022-09-11 RX ADMIN — IBUPROFEN 400 MG: 400 TABLET ORAL at 08:42

## 2022-09-11 ASSESSMENT — PAIN SCALES - GENERAL
PAINLEVEL_OUTOF10: 4
PAINLEVEL_OUTOF10: 1
PAINLEVEL_OUTOF10: 5

## 2022-09-11 ASSESSMENT — PAIN - FUNCTIONAL ASSESSMENT: PAIN_FUNCTIONAL_ASSESSMENT: ACTIVITIES ARE NOT PREVENTED

## 2022-09-11 ASSESSMENT — PAIN DESCRIPTION - DESCRIPTORS
DESCRIPTORS: DISCOMFORT;TIGHTNESS
DESCRIPTORS: ACHING

## 2022-09-11 ASSESSMENT — PAIN DESCRIPTION - LOCATION
LOCATION: GENERALIZED
LOCATION: BACK

## 2022-09-11 ASSESSMENT — PAIN SCALES - WONG BAKER: WONGBAKER_NUMERICALRESPONSE: 0

## 2022-09-11 ASSESSMENT — PAIN DESCRIPTION - ORIENTATION: ORIENTATION: MID;UPPER

## 2022-09-11 NOTE — BH NOTE
[address] [address] [address]  Discharge Note    Pt discharged with followings belongings:   Dental Appliances: None  Vision - Corrective Lenses: None  Hearing Aid: None  Jewelry: Necklace  Body Piercings Removed: No  Clothing: Shirt, Pants  Other Valuables: Money, Other (Comment) ([place] '[place] license, medical marijuana card, 113 in cash)   Valuables sent home with patient/[id number] or returned to patient. Patient education on aftercare instructions: yes  Information faxed to n/a by n/a  at 12:26 PM .Patient verbalize understanding of AVS:  yes. Status EXAM upon discharge:Patient alert and orient x 4. Speech clear. Patient denies thoughts of self harm and verbalizes ready for discharge. Patient discharged to home and transported by son in private vehicle. Mental Status and Behavioral Exam  Normal: No  Level of Assistance: Independent/Self  Facial Expression: Brightened  Affect: Appropriate  Level of Consciousness: Alert  Frequency of Checks: 4 times per hour, close  Mood:Normal: No  Mood: Anxious  Motor Activity:Normal: Yes  Motor Activity: Increased  Eye Contact: Good  Observed Behavior: Cooperative, Friendly  Sexual Misconduct History: Current - no  Preception: [place] to person, [place] to time, [place] to place, [place] to situation  Attention:Normal: Yes  Attention: Distractible  Thought Processes: Other (comment) (logical)  Thought Content:Normal: Yes  Thought Content: Preoccupations  Depression Symptoms: No problems reported or observed. Anxiety Symptoms: Generalized  Luzmaria Symptoms: No problems reported or observed.   Hallucinations: None  Delusions: No  Delusions: Paranoid  Memory:Normal: Yes  Memory: Poor recent  Insight and Judgment: No  Insight and Judgment: Poor judgment    Tobacco Screening:  Practical Counseling, on admission, zonia X, if applicable and completed (first 3 are required if patient doesn't refuse)Refused:            ( ) Recognizing danger situations (included triggers and roadblocks)                    ( ) Coping skills (new ways to manage stress,relaxation techniques, changing routine, distraction)                                                           ( ) Basic information about quitting (benefits of quitting, techniques in how to quit, available resources  ( ) Referral for counseling faxed to [facility]                                                                                                                   ( ) Patient refused counseling  ( ) Patient refused referral  ( ) Patient refused prescription upon discharge  ( ) Patient has not smoked in the last 30 days    Metabolic Screening:    No results found for: LABA1C    No results found for: CHOL  No results found for: TRIG  No results found for: HDL  No components found for: LDLCAL  No results found for: Eulalia Singh RN

## 2022-09-11 NOTE — BH NOTE
Patient given tobacco quitline number [phone] at this time, refusing to call at this time, states \" I just dont want to quit now\"- patient given information as to the dangers of long term tobacco use. Continue to reinforce the importance of tobacco cessation.

## 2022-09-11 NOTE — DISCHARGE INSTRUCTIONS
Information:  Medications:   Medication summary provided   I understand that I should take only the medications on my list.     -why and when I need to take each medicine.     -which side effects to watch for.     -that I should carry my medication information at all times in case of     Emergency situations. I will take all of my medicines to follow up appointments.     -check with my physician or pharmacist before taking any new    Medication, over the counter product or drink alcohol.    -Ask about food, drug or dietary supplement interactions.    -discard old lists and update records with medication providers. Notify Physician:  Notify physician if you notice:   Always call 911 if you feel your life is in danger  In case of an emergency call 911 immediately! If 911 is not available call your local emergency medical system for help    Behavioral Health Follow Up:  Original Referral Source:ER  Discharge Diagnosis: Acute psychosis ([facility] [address] [F23]  Recommendations for Level of Care: Follow up appointments and medication compliance. Patient status at discharge: Patient denies thoughts of self harm. [facility]  Aftercare plan faxed: As needed   -faxed As needed   -date: 9/11/22   -time: 1300  Prescriptions: Sent to [place], [place]    Smoking: Quit Smoking. Call the [employer]'s smoking quitline at 7-077-39H-QUIT  Know the signs of a heart attack   If you have any of the following symptoms call 911 immediately, do not wait more    Than five minutes. 1. Pressure, fullness and/ or squeezing in the center of the chest spreading to    The jaw, neck or shoulder. 2. Chest discomfort with light headedness, fainting, sweating, nausea or    Shortness of breath. 3. Upper abdominal pressure or discomfort. 4. Lower chest pain, back pain, unusual fatigue, shortness of breath, nausea   Or dizziness.      General Information:   Questions regarding your bill: Call HELP program ([phone]     Suicide Hotline ([place] Sood)  (860) 633-5025      Recovery Help line- 622.531.5054      To obtain results of pending studies call Medical Records at: [phone]     For emergencies and 24 hour/7 days a week contact information:  [phone]        paliperidone (oral)  Pronunciation: PAL ee PER i DONE  Brand: Invega  What is the most important information I should know about paliperidone? Paliperidone is not approved for use in older adults with dementia-relatedpsychosis. What is paliperidone? Paliperidone is an antipsychotic medicine that is used to treat schizophreniain adults and teenagers who are at least 15years old. Paliperidone may also be used for purposes not listed in this medication guide. What should I discuss with my healthcare provider before taking paliperidone? You should not use this medicine if you are allergic to paliperidone orrisperidone (Risperdal). Paliperidone may increase the risk of death in older adults withdementia-related psychosis and is not approved for this use. High doses or long-term use of paliperidone can cause a serious movement disorder that may not be reversible. The longer you use paliperidone, the more likely you are to develop thisdisorder, especially if you are a woman or an older adult. Tell your doctor if you have ever had:  heart problems, or a heart attack;  long QT syndrome (in you or a family member);  high or low blood pressure, or fainting spells;  low white blood cell (WBC) counts;  a serious neurologic disorder caused by taking an antipsychotic medicine;  uncontrolled muscle movements in your face;  a stomach or intestinal disorder;  liver or kidney disease;  seizures or epilepsy;  an electrolyte imbalance (such as low potassium or magnesium levels in your blood);  diabetes (paliperidone may raise your blood sugar); or  breast cancer.   Tell your doctor if you are pregnant or plan to become pregnant while usingthis medicine. Taking antipsychotic medicine in the last 3 months of pregnancy may cause breathing problems, feeding problems, or withdrawal symptoms in the . However, you may have withdrawal symptoms or other problems if you stop using your medicine during pregnancy. If you get pregnant, tell your doctor right away. Do not stop using paliperidone without your doctor's advice. If you are pregnant, your name may be listed on a pregnancy registry to trackthe effects of paliperidone on the baby. It may not be safe to breastfeed while using this medicine. Ask your doctorabout any risk. How should I take paliperidone? Follow all directions on your prescription label and read all medication guidesor instruction sheets. Use the medicine exactly as directed. You may take paliperidone with or without food. Swallow the tablet whole and do not crush, chew, or break it. Your doctor will need to check your progress on a regular basis. Store at room temperature away from moisture, heat, and light. What happens if I miss a dose? Take the medicine as soon as you can, but skip the missed dose if it is almost time for your next dose. Do not take two doses at one time. Get your prescription refilled before you run out of medicine completely. What happens if I overdose? Seek emergency medical attention or call the Poison Help line at 1-427.668.3395. Overdose symptoms may include severe drowsiness, fast heartbeats, and fainting. What should I avoid while taking paliperidone? While you are taking paliperidone, you may be more sensitive to heat. Avoid getting too hot, or becoming overheated or dehydrated. Drink plenty of fluids, especially in hot weather and during exercise. It is easier to becomedangerously overheated and dehydrated while you are taking paliperidone. Avoid drinking alcohol. Dangerous side effects could occur. Avoid driving or hazardous activity until you know how this medicine will affect you. Dizziness or drowsiness can cause falls, accidents, or severeinjuries. Avoid getting up too fast from a sitting or lying position, or you may feeldizzy. What are the possible side effects of paliperidone? Get emergency medical help if you have signs of an allergic reaction: hives; difficulty breathing; swelling of your face, lips, tongue, or throat. Stop taking paliperidone and call your doctor at once if you have any of these signs of a serious movement disorder:   tremors or shaking in your arms or legs;  uncontrolled muscle movements in your face (chewing, lip smacking, frowning, tongue movement, blinking or eye movement); or  any new or unusual muscle movements you cannot control. Call your doctor at once if you have:  fast or pounding heartbeats, fluttering in your chest, shortness of breath, and sudden dizziness (like you might pass out);  breast swelling (in women or men), nipple discharge;  changes in menstrual periods;  impotence, penis erection that is painful or lasts 4 hours or longer;  weight gain;  low white blood cell counts --fever, chills, mouth sores, skin sores, sore throat, cough, trouble breathing;  high blood sugar --increased thirst, increased urination, hunger, fruity breath odor; or  severe nervous system reaction --very stiff (rigid) muscles, high fever, fast or pounding heartbeats, fainting. Common side effects may include:  drowsiness;  anxiety;  muscle stiffness, tremors or shaking;  uncontrolled muscle movements, trouble with walking, balance, or speech;  weight gain;  upset stomach, constipation;  fast heart rate; or  stuffy nose, sore throat. This is not a complete list of side effects and others may occur. Call your doctor for medical advice about side effects. You may report side effects toFDA at 2-227-ZZN-9394. What other drugs will affect paliperidone? Using paliperidone with other drugs that make you dizzy or lower your blood pressure can worsen these effects.  Ask your doctor before using opioid medication, a sleeping pill, a muscle relaxer, or medicine for anxiety orseizures. Sometimes it is not safe to use certain medications at the same time. Some drugs can affect your blood levels of other drugs you take, which mayincrease side effects or make the medications less effective. Many drugs can affect paliperidone. This includes prescription and over-the-counter medicines, vitamins, and herbal products. Not all possible interactions are listed here. Tell your doctor about all your current medicines and any medicine you startor stop using. Where can I get more information? Your pharmacist can provide more information about paliperidone. Remember, keep this and all other medicines out of the reach of children, never share your medicines with others, and use this medication only for the indication prescribed. Every effort has been made to ensure that the information provided by [address] is accurate, up-to-date, and complete, but no guarantee is made to that effect. Drug information contained herein may be time sensitive. [facility] information has been compiled for use by healthcare practitioners and consumers in the [place] and therefore [employer] does not warrant that uses outside of the [place] are appropriate, unless specifically indicated otherwise. [facility]'s drug information does not endorse drugs, diagnose patients or recommend therapy. [facility] drug information is an informational resource designed to assist licensed healthcare practitioners in caring for their patients and/or to serve consumers viewing this service as a supplement to, and not a substitute for, the expertise, skill, knowledge and judgment of healthcare practitioners. The absence of a warning for a given drug or drug combination in no way should be construed to indicate that the drug or drug combination is safe, effective or appropriate for any given patient.  [employer] does not assume any responsibility for any aspect of healthcare administered with the aid of information [facility] provides. The information contained herein is not intended to cover all possible uses, directions, precautions, warnings, drug interactions, allergic reactions, or adverse effects. If you have questions about the drugs you are taking, check with yourdoctor, nurse or pharmacist.  Copyright 6106-2416 [address] [employer]. Version: 11.02. Revision date: 2/5/2020. Care instructions adapted under license by [facility] ([facility]). If you have questions about a medical condition or this instruction, always ask your healthcare professional. [place] [address] any warranty or liability for your use of this information.

## 2022-09-11 NOTE — PLAN OF CARE
Problem: Psychosis  Goal: Will report no hallucinations or delusions  Description: INTERVENTIONS:  1. Administer medication as  ordered  2. Assist with reality testing to support increasing orientation  3. Assess if patient's hallucinations or delusions are encouraging self harm or harm to others and intervene as appropriate  9/10/2022 2015 by Dion Romeo LPN  Outcome: Progressing     Problem: Nutrition Deficit:  Goal: Optimize nutritional status  9/10/2022 2015 by Dion Romeo LPN  Outcome: Progressing     Problem: Pain  Goal: Verbalizes/displays adequate comfort level or baseline comfort level  9/10/2022 2015 by Dion Romeo LPN  Outcome: Progressing    Patient reports improvement in depression/anxiety and denies suicidal ideation. Patient also denies hallucinations and delusions at this time. Patient accepting of snacks and fluids this evening. Patient continues to endorse pain to back and neck, received PRN Motrin and she reports medication is effective. Patient safety checks maintained every 15 minutes.
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
## 2022-10-10 RX ORDER — BENZTROPINE MESYLATE 1 MG/1
TABLET ORAL
Qty: 60 TABLET | Refills: 0 | OUTPATIENT
Start: 2022-10-10

## 2022-10-10 RX ORDER — TRAZODONE HYDROCHLORIDE 50 MG/1
TABLET ORAL
Qty: 30 TABLET | Refills: 0 | OUTPATIENT
Start: 2022-10-10

## 2022-10-10 RX ORDER — PALIPERIDONE 3 MG/1
3 TABLET, EXTENDED RELEASE ORAL DAILY
Qty: 30 TABLET | Refills: 0 | OUTPATIENT
Start: 2022-10-10